# Patient Record
Sex: MALE | Race: WHITE | NOT HISPANIC OR LATINO | Employment: OTHER | URBAN - NONMETROPOLITAN AREA
[De-identification: names, ages, dates, MRNs, and addresses within clinical notes are randomized per-mention and may not be internally consistent; named-entity substitution may affect disease eponyms.]

---

## 2019-12-09 ENCOUNTER — OFFICE VISIT (OUTPATIENT)
Dept: FAMILY MEDICINE CLINIC | Facility: CLINIC | Age: 69
End: 2019-12-09

## 2019-12-09 VITALS
WEIGHT: 181.8 LBS | RESPIRATION RATE: 18 BRPM | TEMPERATURE: 98 F | BODY MASS INDEX: 26.93 KG/M2 | HEART RATE: 56 BPM | HEIGHT: 69 IN | DIASTOLIC BLOOD PRESSURE: 80 MMHG | SYSTOLIC BLOOD PRESSURE: 142 MMHG | OXYGEN SATURATION: 98 %

## 2019-12-09 DIAGNOSIS — E66.3 OVERWEIGHT (BMI 25.0-29.9): ICD-10-CM

## 2019-12-09 DIAGNOSIS — E78.1 HYPERTRIGLYCERIDEMIA: ICD-10-CM

## 2019-12-09 DIAGNOSIS — I10 ESSENTIAL HYPERTENSION: ICD-10-CM

## 2019-12-09 DIAGNOSIS — Z13.0 SCREENING FOR DEFICIENCY ANEMIA: ICD-10-CM

## 2019-12-09 DIAGNOSIS — Z76.89 ESTABLISHING CARE WITH NEW DOCTOR, ENCOUNTER FOR: Primary | ICD-10-CM

## 2019-12-09 DIAGNOSIS — Z13.220 LIPID SCREENING: ICD-10-CM

## 2019-12-09 DIAGNOSIS — E29.1 HYPOGONADISM IN MALE: ICD-10-CM

## 2019-12-09 DIAGNOSIS — F32.A ANXIETY AND DEPRESSION: ICD-10-CM

## 2019-12-09 DIAGNOSIS — Z12.5 SCREENING FOR MALIGNANT NEOPLASM OF PROSTATE: ICD-10-CM

## 2019-12-09 DIAGNOSIS — F41.9 ANXIETY AND DEPRESSION: ICD-10-CM

## 2019-12-09 DIAGNOSIS — Z13.1 DIABETES MELLITUS SCREENING: ICD-10-CM

## 2019-12-09 PROCEDURE — 99204 OFFICE O/P NEW MOD 45 MIN: CPT | Performed by: FAMILY MEDICINE

## 2019-12-09 RX ORDER — SIMVASTATIN 40 MG
40 TABLET ORAL DAILY
COMMUNITY
Start: 2019-11-07 | End: 2019-12-09 | Stop reason: SDUPTHER

## 2019-12-09 RX ORDER — VENLAFAXINE HYDROCHLORIDE 75 MG/1
75 CAPSULE, EXTENDED RELEASE ORAL DAILY
Qty: 90 CAPSULE | Refills: 3 | Status: SHIPPED | OUTPATIENT
Start: 2019-12-09 | End: 2020-12-14 | Stop reason: SDUPTHER

## 2019-12-09 RX ORDER — ATENOLOL 50 MG/1
50 TABLET ORAL DAILY
Qty: 90 TABLET | Refills: 3 | Status: SHIPPED | OUTPATIENT
Start: 2019-12-09 | End: 2020-12-14 | Stop reason: SDUPTHER

## 2019-12-09 RX ORDER — LOSARTAN POTASSIUM 50 MG/1
50 TABLET ORAL
COMMUNITY
Start: 2019-09-26 | End: 2019-12-09 | Stop reason: SDUPTHER

## 2019-12-09 RX ORDER — LOSARTAN POTASSIUM 50 MG/1
50 TABLET ORAL
Qty: 90 TABLET | Refills: 3 | Status: SHIPPED | OUTPATIENT
Start: 2019-12-09 | End: 2020-06-09 | Stop reason: ALTCHOICE

## 2019-12-09 RX ORDER — LOSARTAN POTASSIUM AND HYDROCHLOROTHIAZIDE 12.5; 5 MG/1; MG/1
TABLET ORAL DAILY
COMMUNITY
Start: 2019-11-30 | End: 2019-12-09 | Stop reason: SDUPTHER

## 2019-12-09 RX ORDER — ASPIRIN 81 MG/1
81 TABLET, CHEWABLE ORAL DAILY
COMMUNITY

## 2019-12-09 RX ORDER — CHLORAL HYDRATE 500 MG
2000 CAPSULE ORAL 2 TIMES DAILY WITH MEALS
COMMUNITY

## 2019-12-09 RX ORDER — FENOFIBRATE 160 MG/1
160 TABLET ORAL DAILY
COMMUNITY
Start: 2019-11-21 | End: 2019-12-09 | Stop reason: SDUPTHER

## 2019-12-09 RX ORDER — VENLAFAXINE HYDROCHLORIDE 75 MG/1
75 CAPSULE, EXTENDED RELEASE ORAL DAILY
COMMUNITY
Start: 2019-12-02 | End: 2019-12-09 | Stop reason: SDUPTHER

## 2019-12-09 RX ORDER — TESTOSTERONE 10 MG/.5G
GEL, METERED TOPICAL DAILY
Refills: 1 | COMMUNITY
Start: 2019-10-19 | End: 2020-01-02 | Stop reason: SDUPTHER

## 2019-12-09 RX ORDER — SIMVASTATIN 40 MG
20 TABLET ORAL NIGHTLY
Qty: 90 TABLET | Refills: 1 | Status: SHIPPED | OUTPATIENT
Start: 2019-12-09 | End: 2020-06-09 | Stop reason: SDUPTHER

## 2019-12-09 RX ORDER — FENOFIBRATE 160 MG/1
160 TABLET ORAL DAILY
Qty: 90 TABLET | Refills: 0 | Status: SHIPPED | OUTPATIENT
Start: 2019-12-09 | End: 2020-05-18

## 2019-12-09 RX ORDER — ATENOLOL 50 MG/1
50 TABLET ORAL DAILY
COMMUNITY
Start: 2019-11-07 | End: 2019-12-09 | Stop reason: SDUPTHER

## 2019-12-09 RX ORDER — LOSARTAN POTASSIUM AND HYDROCHLOROTHIAZIDE 12.5; 5 MG/1; MG/1
1 TABLET ORAL DAILY
Qty: 90 TABLET | Refills: 3 | Status: SHIPPED | OUTPATIENT
Start: 2019-12-09 | End: 2020-02-26

## 2019-12-09 RX ORDER — ASCORBIC ACID 500 MG
500 TABLET ORAL DAILY
COMMUNITY

## 2019-12-09 NOTE — PATIENT INSTRUCTIONS
"Hypertension  Hypertension, commonly called high blood pressure, is when the force of blood pumping through the arteries is too strong. The arteries are the blood vessels that carry blood from the heart throughout the body. Hypertension forces the heart to work harder to pump blood and may cause arteries to become narrow or stiff. Having untreated or uncontrolled hypertension can cause heart attacks, strokes, kidney disease, and other problems.  A blood pressure reading consists of a higher number over a lower number. Ideally, your blood pressure should be below 120/80. The first (\"top\") number is called the systolic pressure. It is a measure of the pressure in your arteries as your heart beats. The second (\"bottom\") number is called the diastolic pressure. It is a measure of the pressure in your arteries as the heart relaxes.  What are the causes?  The cause of this condition is not known.  What increases the risk?  Some risk factors for high blood pressure are under your control. Others are not.  Factors you can change  · Smoking.  · Having type 2 diabetes mellitus, high cholesterol, or both.  · Not getting enough exercise or physical activity.  · Being overweight.  · Having too much fat, sugar, calories, or salt (sodium) in your diet.  · Drinking too much alcohol.  Factors that are difficult or impossible to change  · Having chronic kidney disease.  · Having a family history of high blood pressure.  · Age. Risk increases with age.  · Race. You may be at higher risk if you are -American.  · Gender. Men are at higher risk than women before age 45. After age 65, women are at higher risk than men.  · Having obstructive sleep apnea.  · Stress.  What are the signs or symptoms?  Extremely high blood pressure (hypertensive crisis) may cause:  · Headache.  · Anxiety.  · Shortness of breath.  · Nosebleed.  · Nausea and vomiting.  · Severe chest pain.  · Jerky movements you cannot control (seizures).  How is this " diagnosed?  This condition is diagnosed by measuring your blood pressure while you are seated, with your arm resting on a surface. The cuff of the blood pressure monitor will be placed directly against the skin of your upper arm at the level of your heart. It should be measured at least twice using the same arm. Certain conditions can cause a difference in blood pressure between your right and left arms.  Certain factors can cause blood pressure readings to be lower or higher than normal (elevated) for a short period of time:  · When your blood pressure is higher when you are in a health care provider's office than when you are at home, this is called white coat hypertension. Most people with this condition do not need medicines.  · When your blood pressure is higher at home than when you are in a health care provider's office, this is called masked hypertension. Most people with this condition may need medicines to control blood pressure.  If you have a high blood pressure reading during one visit or you have normal blood pressure with other risk factors:  · You may be asked to return on a different day to have your blood pressure checked again.  · You may be asked to monitor your blood pressure at home for 1 week or longer.  If you are diagnosed with hypertension, you may have other blood or imaging tests to help your health care provider understand your overall risk for other conditions.  How is this treated?  This condition is treated by making healthy lifestyle changes, such as eating healthy foods, exercising more, and reducing your alcohol intake. Your health care provider may prescribe medicine if lifestyle changes are not enough to get your blood pressure under control, and if:  · Your systolic blood pressure is above 130.  · Your diastolic blood pressure is above 80.  Your personal target blood pressure may vary depending on your medical conditions, your age, and other factors.  Follow these instructions  at home:  Eating and drinking    · Eat a diet that is high in fiber and potassium, and low in sodium, added sugar, and fat. An example eating plan is called the DASH (Dietary Approaches to Stop Hypertension) diet. To eat this way:  ? Eat plenty of fresh fruits and vegetables. Try to fill half of your plate at each meal with fruits and vegetables.  ? Eat whole grains, such as whole wheat pasta, brown rice, or whole grain bread. Fill about one quarter of your plate with whole grains.  ? Eat or drink low-fat dairy products, such as skim milk or low-fat yogurt.  ? Avoid fatty cuts of meat, processed or cured meats, and poultry with skin. Fill about one quarter of your plate with lean proteins, such as fish, chicken without skin, beans, eggs, and tofu.  ? Avoid premade and processed foods. These tend to be higher in sodium, added sugar, and fat.  · Reduce your daily sodium intake. Most people with hypertension should eat less than 1,500 mg of sodium a day.  · Limit alcohol intake to no more than 1 drink a day for nonpregnant women and 2 drinks a day for men. One drink equals 12 oz of beer, 5 oz of wine, or 1½ oz of hard liquor.  Lifestyle    · Work with your health care provider to maintain a healthy body weight or to lose weight. Ask what an ideal weight is for you.  · Get at least 30 minutes of exercise that causes your heart to beat faster (aerobic exercise) most days of the week. Activities may include walking, swimming, or biking.  · Include exercise to strengthen your muscles (resistance exercise), such as pilates or lifting weights, as part of your weekly exercise routine. Try to do these types of exercises for 30 minutes at least 3 days a week.  · Do not use any products that contain nicotine or tobacco, such as cigarettes and e-cigarettes. If you need help quitting, ask your health care provider.  · Monitor your blood pressure at home as told by your health care provider.  · Keep all follow-up visits as told by  "your health care provider. This is important.  Medicines  · Take over-the-counter and prescription medicines only as told by your health care provider. Follow directions carefully. Blood pressure medicines must be taken as prescribed.  · Do not skip doses of blood pressure medicine. Doing this puts you at risk for problems and can make the medicine less effective.  · Ask your health care provider about side effects or reactions to medicines that you should watch for.  Contact a health care provider if:  · You think you are having a reaction to a medicine you are taking.  · You have headaches that keep coming back (recurring).  · You feel dizzy.  · You have swelling in your ankles.  · You have trouble with your vision.  Get help right away if:  · You develop a severe headache or confusion.  · You have unusual weakness or numbness.  · You feel faint.  · You have severe pain in your chest or abdomen.  · You vomit repeatedly.  · You have trouble breathing.  Summary  · Hypertension is when the force of blood pumping through your arteries is too strong. If this condition is not controlled, it may put you at risk for serious complications.  · Your personal target blood pressure may vary depending on your medical conditions, your age, and other factors. For most people, a normal blood pressure is less than 120/80.  · Hypertension is treated with lifestyle changes, medicines, or a combination of both. Lifestyle changes include weight loss, eating a healthy, low-sodium diet, exercising more, and limiting alcohol.  This information is not intended to replace advice given to you by your health care provider. Make sure you discuss any questions you have with your health care provider.  Document Released: 12/18/2006 Document Revised: 11/15/2017 Document Reviewed: 11/15/2017  [employer] Interactive Patient Education © 2019 [employer]      DASH Eating Plan  DASH stands for \"Dietary Approaches to Stop Hypertension.\" The DASH eating " "plan is a healthy eating plan that has been shown to reduce high blood pressure (hypertension). It may also reduce your risk for type 2 diabetes, heart disease, and stroke. The DASH eating plan may also help with weight loss.  What are tips for following this plan?    General guidelines  · Avoid eating more than 2,300 mg (milligrams) of salt (sodium) a day. If you have hypertension, you may need to reduce your sodium intake to 1,500 mg a day.  · Limit alcohol intake to no more than 1 drink a day for nonpregnant women and 2 drinks a day for men. One drink equals 12 oz of beer, 5 oz of wine, or 1½ oz of hard liquor.  · Work with your health care provider to maintain a healthy body weight or to lose weight. Ask what an ideal weight is for you.  · Get at least 30 minutes of exercise that causes your heart to beat faster (aerobic exercise) most days of the week. Activities may include walking, swimming, or biking.  · Work with your health care provider or diet and nutrition specialist (dietitian) to adjust your eating plan to your individual calorie needs.  Reading food labels    · Check food labels for the amount of sodium per serving. Choose foods with less than 5 percent of the Daily Value of sodium. Generally, foods with less than 300 mg of sodium per serving fit into this eating plan.  · To find whole grains, look for the word \"whole\" as the first word in the ingredient list.  Shopping  · Buy products labeled as \"low-sodium\" or \"no salt added.\"  · Buy fresh foods. Avoid canned foods and premade or frozen meals.  Cooking  · Avoid adding salt when cooking. Use salt-free seasonings or herbs instead of table salt or sea salt. Check with your health care provider or pharmacist before using salt substitutes.  · Do not henao foods. Cook foods using healthy methods such as baking, boiling, grilling, and broiling instead.  · Cook with heart-healthy oils, such as olive, canola, soybean, or sunflower oil.  Meal planning  · Eat a " balanced diet that includes:  ? 5 or more servings of fruits and vegetables each day. At each meal, try to fill half of your plate with fruits and vegetables.  ? Up to 6-8 servings of whole grains each day.  ? Less than 6 oz of lean meat, poultry, or fish each day. A 3-oz serving of meat is about the same size as a deck of cards. One egg equals 1 oz.  ? 2 servings of low-fat dairy each day.  ? A serving of nuts, seeds, or beans 5 times each week.  ? Heart-healthy fats. Healthy fats called Omega-3 fatty acids are found in foods such as flaxseeds and coldwater fish, like sardines, salmon, and mackerel.  · Limit how much you eat of the following:  ? Canned or prepackaged foods.  ? Food that is high in trans fat, such as fried foods.  ? Food that is high in saturated fat, such as fatty meat.  ? Sweets, desserts, sugary drinks, and other foods with added sugar.  ? Full-fat dairy products.  · Do not salt foods before eating.  · Try to eat at least 2 vegetarian meals each week.  · Eat more home-cooked food and less restaurant, buffet, and fast food.  · When eating at a restaurant, ask that your food be prepared with less salt or no salt, if possible.  What foods are recommended?  The items listed may not be a complete list. Talk with your dietitian about what dietary choices are best for you.  Grains  Whole-grain or whole-wheat bread. Whole-grain or whole-wheat pasta. Brown rice. Oatmeal. Quinoa. Bulgur. Whole-grain and low-sodium cereals. Olga Lidia bread. Low-fat, low-sodium crackers. Whole-wheat flour tortillas.  Vegetables  Fresh or frozen vegetables (raw, steamed, roasted, or grilled). Low-sodium or reduced-sodium tomato and vegetable juice. Low-sodium or reduced-sodium tomato sauce and tomato paste. Low-sodium or reduced-sodium canned vegetables.  Fruits  All fresh, dried, or frozen fruit. Canned fruit in natural juice (without added sugar).  Meat and other protein foods  Skinless chicken or turkey. Ground chicken or  turkey. Pork with fat trimmed off. Fish and seafood. Egg whites. Dried beans, peas, or lentils. Unsalted nuts, nut butters, and seeds. Unsalted canned beans. Lean cuts of beef with fat trimmed off. Low-sodium, lean deli meat.  Dairy  Low-fat (1%) or fat-free (skim) milk. Fat-free, low-fat, or reduced-fat cheeses. Nonfat, low-sodium ricotta or cottage cheese. Low-fat or nonfat yogurt. Low-fat, low-sodium cheese.  Fats and oils  Soft margarine without trans fats. Vegetable oil. Low-fat, reduced-fat, or light mayonnaise and salad dressings (reduced-sodium). Canola, safflower, olive, soybean, and sunflower oils. Avocado.  Seasoning and other foods  Herbs. Spices. Seasoning mixes without salt. Unsalted popcorn and pretzels. Fat-free sweets.  What foods are not recommended?  The items listed may not be a complete list. Talk with your dietitian about what dietary choices are best for you.  Grains  Baked goods made with fat, such as croissants, muffins, or some breads. Dry pasta or rice meal packs.  Vegetables  Creamed or fried vegetables. Vegetables in a cheese sauce. Regular canned vegetables (not low-sodium or reduced-sodium). Regular canned tomato sauce and paste (not low-sodium or reduced-sodium). Regular tomato and vegetable juice (not low-sodium or reduced-sodium). Pickles. Olives.  Fruits  Canned fruit in a light or heavy syrup. Fried fruit. Fruit in cream or butter sauce.  Meat and other protein foods  Fatty cuts of meat. Ribs. Fried meat. Marie. Sausage. Bologna and other processed lunch meats. Salami. Fatback. Hotdogs. Bratwurst. Salted nuts and seeds. Canned beans with added salt. Canned or smoked fish. Whole eggs or egg yolks. Chicken or turkey with skin.  Dairy  Whole or 2% milk, cream, and half-and-half. Whole or full-fat cream cheese. Whole-fat or sweetened yogurt. Full-fat cheese. Nondairy creamers. Whipped toppings. Processed cheese and cheese spreads.  Fats and oils  Butter. Stick margarine. Lard.  Shortening. Ghee. Marie fat. Tropical oils, such as coconut, palm kernel, or palm oil.  Seasoning and other foods  Salted popcorn and pretzels. Onion salt, garlic salt, seasoned salt, table salt, and sea salt. Worcestershire sauce. Tartar sauce. Barbecue sauce. Teriyaki sauce. Soy sauce, including reduced-sodium. Steak sauce. Canned and packaged gravies. Fish sauce. Oyster sauce. Cocktail sauce. Horseradish that you find on the shelf. Ketchup. Mustard. Meat flavorings and tenderizers. Bouillon cubes. Hot sauce and Tabasco sauce. Premade or packaged marinades. Premade or packaged taco seasonings. Relishes. Regular salad dressings.  Where to find more information:  · National Heart, Lung, and Blood [place]: www.nhlbi.nih.gov  · American Heart Association: www.heart.org  Summary  · The DASH eating plan is a healthy eating plan that has been shown to reduce high blood pressure (hypertension). It may also reduce your risk for type 2 diabetes, heart disease, and stroke.  · With the DASH eating plan, you should limit salt (sodium) intake to 2,300 mg a day. If you have hypertension, you may need to reduce your sodium intake to 1,500 mg a day.  · When on the DASH eating plan, aim to eat more fresh fruits and vegetables, whole grains, lean proteins, low-fat dairy, and heart-healthy fats.  · Work with your health care provider or diet and nutrition specialist (dietitian) to adjust your eating plan to your individual calorie needs.  This information is not intended to replace advice given to you by your health care provider. Make sure you discuss any questions you have with your health care provider.  Document Released: 12/06/2012 Document Revised: 12/11/2017 Document Reviewed: 12/11/2017  [employer] Interactive Patient Education © 2019 [employer]

## 2019-12-09 NOTE — PROGRESS NOTES
"Subjective cc: est care for preventative health/HTN  Saman Ovalles is a 69 y.o. male who presents to est care for HTN and HLD.     HLD: on statin, fibrate and fish oil, several years, better controlled per pt   No MI, no CVA   HTN: 1 year ago, losartan/HCTZ in AM, losartan QHS, atenolol - for irregular HR  Vit d def: on supplement  Low testosterone - on gel daily for about 15 years, no known prostate CA, no FH of prostate CA, denies urinary symptoms.   Depression: working well on Effexor, 25 years     No other known medical issues     No prior surgeries   Non smoker, former smoker when teenager - quit at age 20, maybe smoked 3-5 years   Alcohol use every other day, drinks beer, 6 every other day   No illicit drug use   Retired - worked for government     FH: father with CAD -  from bypass - age 62 when he passed away.   Sister with breast cancer   No known colon cancer     Pt denies any symptoms     Colonoscopy: 4 years ago - done at [facility], normal per pt     History of Present Illness     The following portions of the patient's history were reviewed and updated as appropriate: allergies, current medications, past family history, past medical history, past social history, past surgical history and problem list.        Review of Systems   Constitutional: Negative for activity change, appetite change, fatigue, fever and unexpected weight change.   Respiratory: Negative for cough and shortness of breath.    Cardiovascular: Negative for chest pain, palpitations and leg swelling.   Gastrointestinal: Negative for abdominal distention, abdominal pain, blood in stool, constipation, diarrhea, nausea and vomiting.   Genitourinary: Negative for difficulty urinating, dysuria, flank pain, frequency and hematuria.   All other systems reviewed and are negative.      Objective   Blood pressure 142/80, pulse 56, temperature 98 °F (36.7 °C), temperature source Oral, resp. rate 18, height 175.3 cm (69\"), weight 82.5 kg (181 " lb 12.8 oz), SpO2 98 %.  Physical Exam   Constitutional: He is oriented to person, place, and time. He appears well-developed and well-nourished. No distress.   HENT:   Head: Normocephalic and atraumatic.   Right Ear: External ear normal.   Left Ear: External ear normal.   Nose: Nose normal.   Mouth/Throat: Oropharynx is clear and moist.   Eyes: Conjunctivae and EOM are normal. Right eye exhibits no discharge. Left eye exhibits no discharge.   Neck: Normal range of motion. No tracheal deviation present. No thyromegaly present.   Cardiovascular: Normal rate, regular rhythm, normal heart sounds and intact distal pulses.   Pulmonary/Chest: Effort normal and breath sounds normal. No stridor. No respiratory distress. He has no wheezes. He exhibits no tenderness.   Abdominal: Soft. Bowel sounds are normal. He exhibits no distension. There is no tenderness.   Musculoskeletal: Normal range of motion. He exhibits no edema.   Lymphadenopathy:     He has no cervical adenopathy.   Neurological: He is alert and oriented to person, place, and time. He exhibits normal muscle tone. Coordination normal.   Skin: Skin is warm and dry. He is not diaphoretic.   Psychiatric: He has a normal mood and affect. His behavior is normal. Judgment and thought content normal.   Nursing note and vitals reviewed.      Assessment/Plan   Problems Addressed this Visit        Cardiovascular and Mediastinum    Hypertriglyceridemia    Relevant Medications    simvastatin (ZOCOR) 40 MG tablet    fenofibrate 160 MG tablet    Essential hypertension    Relevant Medications    losartan-hydrochlorothiazide (HYZAAR) 50-12.5 MG per tablet    losartan (COZAAR) 50 MG tablet    atenolol (TENORMIN) 50 MG tablet       Endocrine    Hypogonadism in male    Relevant Orders    Testosterone       Other    Anxiety and depression    Relevant Medications    venlafaxine XR (EFFEXOR-XR) 75 MG 24 hr capsule    Overweight (BMI 25.0-29.9)      Other Visit Diagnoses      Establishing care with new doctor, encounter for    -  Primary    Lipid screening        Relevant Orders    Lipid Panel    Screening for malignant neoplasm of prostate        Relevant Orders    PSA Screen    Diabetes mellitus screening        Relevant Orders    Comprehensive Metabolic Panel    Screening for deficiency anemia        Relevant Orders    CBC & Differential          PLAN:     #1 HLD: new to me, chronic for pt, continue on current medicaiton, check labs     #2 HTN: new to me, chronic for pt, continue on current medciation, check labs     #3 hypogonadism: new to me, chronic for pt, check labs, conitnue on testosterone     #4 anxiety and depression: new to me, chronic for pt, conitue on effexor     #5 overweight: Patient's Body mass index is 26.85 kg/m². BMI is above normal parameters. Recommendations include: educational material, exercise counseling and nutrition counseling.          This document has been electronically signed by Dawna Daniels MD on December 26, 2019 4:18 PM

## 2019-12-26 PROBLEM — E78.1 HYPERTRIGLYCERIDEMIA: Status: ACTIVE | Noted: 2019-12-26

## 2019-12-26 PROBLEM — E29.1 HYPOGONADISM IN MALE: Status: ACTIVE | Noted: 2019-12-26

## 2019-12-26 PROBLEM — F32.A ANXIETY AND DEPRESSION: Status: ACTIVE | Noted: 2019-12-26

## 2019-12-26 PROBLEM — F41.9 ANXIETY AND DEPRESSION: Status: ACTIVE | Noted: 2019-12-26

## 2019-12-26 PROBLEM — E66.3 OVERWEIGHT (BMI 25.0-29.9): Status: ACTIVE | Noted: 2019-12-26

## 2019-12-26 PROBLEM — I10 ESSENTIAL HYPERTENSION: Status: ACTIVE | Noted: 2019-12-26

## 2019-12-31 ENCOUNTER — TELEPHONE (OUTPATIENT)
Dept: FAMILY MEDICINE CLINIC | Facility: CLINIC | Age: 69
End: 2019-12-31

## 2019-12-31 NOTE — TELEPHONE ENCOUNTER
----- Message from Dawna Daniels MD sent at 12/9/2019  2:23 PM CST -----  Please contact [facility] and determine if they will do testosterone or no? If not - will need to send it to local [facility]

## 2019-12-31 NOTE — TELEPHONE ENCOUNTER
Spoke with [facility] pharmacy who reports that they will fill testosterone. Spoke with Lars to verify.

## 2019-12-31 NOTE — PROGRESS NOTES
Pt was unclear on when he was supposed to have lab testing completed- pt states that he prefers to have labs drawn prior to apt so he can discuss in office with provider. Explained to pt that we can do this going forward, but since he was a new pt that's why labs were done this way this time. Pt also states that his ins wants him to have completed at a [facility], pt states that he will notify us of where to fax lab orders to or will come by to .

## 2020-01-02 DIAGNOSIS — E29.1 HYPOGONADISM IN MALE: Primary | ICD-10-CM

## 2020-01-02 RX ORDER — TESTOSTERONE 10 MG/.5G
1 GEL, METERED TOPICAL DAILY
Qty: 60 G | Refills: 1 | Status: SHIPPED | OUTPATIENT
Start: 2020-01-02 | End: 2020-03-31 | Stop reason: SDUPTHER

## 2020-02-26 RX ORDER — LOSARTAN POTASSIUM 50 MG/1
50 TABLET ORAL DAILY
Qty: 90 TABLET | Refills: 1 | Status: SHIPPED | OUTPATIENT
Start: 2020-02-26 | End: 2020-06-09 | Stop reason: SDUPTHER

## 2020-02-26 RX ORDER — HYDROCHLOROTHIAZIDE 12.5 MG/1
12.5 CAPSULE, GELATIN COATED ORAL DAILY
Qty: 90 CAPSULE | Refills: 1 | Status: SHIPPED | OUTPATIENT
Start: 2020-02-26 | End: 2020-06-09 | Stop reason: ALTCHOICE

## 2020-03-31 DIAGNOSIS — E29.1 HYPOGONADISM IN MALE: ICD-10-CM

## 2020-03-31 RX ORDER — TESTOSTERONE 10 MG/.5G
1 GEL, METERED TOPICAL DAILY
Qty: 180 G | Refills: 0 | Status: SHIPPED | OUTPATIENT
Start: 2020-03-31 | End: 2020-04-03 | Stop reason: SDUPTHER

## 2020-04-03 DIAGNOSIS — E29.1 HYPOGONADISM IN MALE: ICD-10-CM

## 2020-04-03 RX ORDER — TESTOSTERONE 10 MG/.5G
1 GEL, METERED TOPICAL DAILY
Qty: 180 G | Refills: 0 | Status: SHIPPED | OUTPATIENT
Start: 2020-04-03 | End: 2020-06-09 | Stop reason: SDUPTHER

## 2020-05-06 DIAGNOSIS — E29.1 HYPOGONADISM IN MALE: ICD-10-CM

## 2020-05-06 NOTE — TELEPHONE ENCOUNTER
PT CALLED REQUESTING A REFILL ON  - Testosterone 10 MG/ACT (2%) gel    PT ALSO REQUESTED A 90 SUPPLY.     [facility] [place], [place] - [address] AT [address] [facility] - [phone]  - [phone] FX  [phone]    PT'S CALLBACK NUMBER: [phone]

## 2020-05-07 RX ORDER — TESTOSTERONE 10 MG/.5G
1 GEL, METERED TOPICAL DAILY
Qty: 180 G | Refills: 0 | OUTPATIENT
Start: 2020-05-07

## 2020-05-16 DIAGNOSIS — E78.1 HYPERTRIGLYCERIDEMIA: ICD-10-CM

## 2020-05-18 RX ORDER — FENOFIBRATE 160 MG/1
TABLET ORAL
Qty: 90 TABLET | Refills: 0 | Status: SHIPPED | OUTPATIENT
Start: 2020-05-18 | End: 2020-08-17

## 2020-06-09 ENCOUNTER — OFFICE VISIT (OUTPATIENT)
Dept: FAMILY MEDICINE CLINIC | Facility: CLINIC | Age: 70
End: 2020-06-09

## 2020-06-09 VITALS
RESPIRATION RATE: 18 BRPM | WEIGHT: 174.4 LBS | DIASTOLIC BLOOD PRESSURE: 74 MMHG | BODY MASS INDEX: 25.83 KG/M2 | SYSTOLIC BLOOD PRESSURE: 128 MMHG | TEMPERATURE: 97.5 F | OXYGEN SATURATION: 98 % | HEART RATE: 72 BPM | HEIGHT: 69 IN

## 2020-06-09 DIAGNOSIS — I10 ESSENTIAL HYPERTENSION: ICD-10-CM

## 2020-06-09 DIAGNOSIS — E29.1 HYPOGONADISM IN MALE: ICD-10-CM

## 2020-06-09 DIAGNOSIS — E66.3 OVERWEIGHT: ICD-10-CM

## 2020-06-09 DIAGNOSIS — F41.9 ANXIETY AND DEPRESSION: ICD-10-CM

## 2020-06-09 DIAGNOSIS — R73.09 ELEVATED GLUCOSE: ICD-10-CM

## 2020-06-09 DIAGNOSIS — E78.1 HYPERTRIGLYCERIDEMIA: ICD-10-CM

## 2020-06-09 DIAGNOSIS — F32.A ANXIETY AND DEPRESSION: ICD-10-CM

## 2020-06-09 DIAGNOSIS — R94.4 DECREASED GFR: Primary | ICD-10-CM

## 2020-06-09 PROCEDURE — 99214 OFFICE O/P EST MOD 30 MIN: CPT | Performed by: FAMILY MEDICINE

## 2020-06-09 RX ORDER — TESTOSTERONE 10 MG/.5G
6 GEL, METERED TOPICAL DAILY
Qty: 300 G | Refills: 0 | Status: SHIPPED | OUTPATIENT
Start: 2020-06-09 | End: 2020-10-19

## 2020-06-09 RX ORDER — LOSARTAN POTASSIUM 50 MG/1
50 TABLET ORAL DAILY
Qty: 90 TABLET | Refills: 1 | Status: SHIPPED | OUTPATIENT
Start: 2020-06-09 | End: 2020-12-14 | Stop reason: SDUPTHER

## 2020-06-09 RX ORDER — LOSARTAN POTASSIUM AND HYDROCHLOROTHIAZIDE 12.5; 5 MG/1; MG/1
1 TABLET ORAL DAILY
COMMUNITY
End: 2020-06-09

## 2020-06-09 RX ORDER — SIMVASTATIN 40 MG
40 TABLET ORAL NIGHTLY
Qty: 90 TABLET | Refills: 1 | Status: SHIPPED | OUTPATIENT
Start: 2020-06-09 | End: 2020-12-14 | Stop reason: ALTCHOICE

## 2020-06-09 RX ORDER — LOSARTAN POTASSIUM AND HYDROCHLOROTHIAZIDE 12.5; 5 MG/1; MG/1
1 TABLET ORAL DAILY
Qty: 90 TABLET | Refills: 1 | Status: SHIPPED | OUTPATIENT
Start: 2020-06-09 | End: 2020-11-30

## 2020-06-09 NOTE — PROGRESS NOTES
Subjective cc: HTN  Saman Ovalles is a 69 y.o. male who presents for follow up HTN and HLD and to review labs.    No acute complaints today.      HLD: on statin, fibrate and fish oil, several years, lipids could be better, triglycerides are always high per pt, rec increasing dose of simvastatin   No MI, no CVA   HTN: chronic, controlled. losartan/HCTZ in AM, losartan QHS, atenolol - for irregular HR  Vit d def: on supplement  Low testosterone - on gel daily for about 15 years, no known prostate CA, no FH of prostate CA, denies urinary symptoms. Reports he has not been able to get enough of the gel to last him the full 3 months - he is using 6 pumps daily total - the last rx was only written for 1 pump daily.   Depression: working well on Effexor, 25 years   Labs show decreased GFR - pt denies having any problems with this int he past   His glucose was 173 - denies prior dx of diabetes.       No prior surgeries   Non smoker, former smoker when teenager - quit at age 20, maybe smoked 3-5 years   Alcohol use every other day, drinks beer, 6 every other day   No illicit drug use   Retired - worked for [employer]     FH: father with CAD -  from bypass - age 62 when he passed away.   Sister with breast cancer   No known colon cancer     Pt denies any symptoms     Colonoscopy: 4 years ago - done at [facility], normal per pt - reviewed reports - repeat in 10 years    Hypertension   This is a chronic problem. The current episode started more than 1 year ago. The problem is unchanged. The problem is controlled. Pertinent negatives include no anxiety, blurred vision, chest pain, headaches, malaise/fatigue, neck pain, orthopnea, palpitations, peripheral edema, PND, shortness of breath or sweats. Risk factors for coronary artery disease include male gender and dyslipidemia. Current antihypertension treatment includes angiotensin blockers, diuretics and beta blockers. The current treatment provides significant improvement.  Compliance problems include diet, exercise and psychosocial issues.  Hypertensive end-organ damage includes kidney disease. There is no history of angina, CAD/MI, CVA, heart failure or left ventricular hypertrophy. There is no history of sleep apnea or a thyroid problem.   Hyperlipidemia   This is a chronic problem. The current episode started more than 1 year ago. The problem is uncontrolled. Recent lipid tests were reviewed and are high. He has no history of hypothyroidism, liver disease, obesity or nephrotic syndrome. Factors aggravating his hyperlipidemia include fatty foods and beta blockers. Associated symptoms include myalgias. Pertinent negatives include no chest pain, focal sensory loss, focal weakness, leg pain or shortness of breath. Current antihyperlipidemic treatment includes diet change, exercise, fibric acid derivatives and statins. The current treatment provides mild improvement of lipids. Compliance problems include adherence to diet and adherence to exercise.  Risk factors for coronary artery disease include dyslipidemia, male sex and hypertension.        The following portions of the patient's history were reviewed and updated as appropriate: allergies, current medications, past family history, past medical history, past social history, past surgical history and problem list.        Review of Systems   Constitutional: Negative for activity change, appetite change, fatigue, fever, malaise/fatigue and unexpected weight change.   Eyes: Negative for blurred vision.   Respiratory: Negative for cough and shortness of breath.    Cardiovascular: Negative for chest pain, palpitations, orthopnea, leg swelling and PND.   Gastrointestinal: Negative for abdominal distention, abdominal pain, blood in stool, constipation, diarrhea, nausea and vomiting.   Genitourinary: Negative for difficulty urinating, dysuria, flank pain, frequency and hematuria.   Musculoskeletal: Positive for myalgias. Negative for neck  "pain.   Neurological: Negative for focal weakness and headaches.   Psychiatric/Behavioral:        Controlled    All other systems reviewed and are negative.      Objective   Blood pressure 128/74, pulse 72, temperature 97.5 °F (36.4 °C), temperature source Temporal, resp. rate 18, height 175.3 cm (69\"), weight 79.1 kg (174 lb 6.4 oz), SpO2 98 %.  Physical Exam   Constitutional: He is oriented to person, place, and time. He appears well-developed and well-nourished. No distress.   HENT:   Head: Normocephalic and atraumatic.   Right Ear: External ear normal.   Left Ear: External ear normal.   Nose: Nose normal.   Mouth/Throat: Oropharynx is clear and moist.   Eyes: Conjunctivae and EOM are normal. Right eye exhibits no discharge. Left eye exhibits no discharge.   Neck: Normal range of motion. No tracheal deviation present. No thyromegaly present.   Cardiovascular: Normal rate, regular rhythm, normal heart sounds and intact distal pulses.   Pulmonary/Chest: Effort normal and breath sounds normal. No stridor. No respiratory distress. He has no wheezes. He exhibits no tenderness.   Abdominal: Soft. Bowel sounds are normal. He exhibits no distension. There is no tenderness.   Musculoskeletal: Normal range of motion. He exhibits no edema.   Lymphadenopathy:     He has no cervical adenopathy.   Neurological: He is alert and oriented to person, place, and time. He exhibits normal muscle tone. Coordination normal.   Skin: Skin is warm and dry. He is not diaphoretic.   Psychiatric: He has a normal mood and affect. His behavior is normal. Judgment and thought content normal.   Nursing note and vitals reviewed.      Assessment/Plan   Problems Addressed this Visit        Cardiovascular and Mediastinum    Hypertriglyceridemia    Relevant Medications    simvastatin (ZOCOR) 40 MG tablet    Essential hypertension    Relevant Medications    losartan (Cozaar) 50 MG tablet    losartan-hydrochlorothiazide (HYZAAR) 50-12.5 MG per tablet   "       Endocrine    Hypogonadism in male    Relevant Medications    Testosterone 10 MG/ACT (2%) gel       Genitourinary    Decreased GFR - Primary    Relevant Orders    Basic metabolic panel       Other    Anxiety and depression    Overweight      Other Visit Diagnoses     Elevated glucose        Relevant Orders    Basic metabolic panel    Hemoglobin A1c          PLAN:     #1 HLD: chronic for pt, uncontrolled, increase dose of statin, reviewed labs     #2 HTN: controlled, chronic for pt, continue on current medciation    #3 hypogonadism: chronic for pt, reviewed labs, conitnue on testosterone - rx changed to give full 3 month supply     #4 anxiety and depression:  chronic for pt, controlled, conitue on effexor     #5 overweight: Patient's Body mass index is 25.75 kg/m². BMI is above normal parameters. Recommendations include: educational material, exercise counseling and nutrition counseling.    #6 elevated glucose: new, will get ha1c - return to office for further discussion if dx with DM     #7 decreased GFR: new, repeat labs, if still low will rec renal US and adjust medications           This document has been electronically signed by Dawna Daniels MD on June 9, 2020 13:20

## 2020-06-14 ENCOUNTER — RESULTS ENCOUNTER (OUTPATIENT)
Dept: FAMILY MEDICINE CLINIC | Facility: CLINIC | Age: 70
End: 2020-06-14

## 2020-06-14 DIAGNOSIS — R73.09 ELEVATED GLUCOSE: ICD-10-CM

## 2020-06-14 DIAGNOSIS — R94.4 DECREASED GFR: ICD-10-CM

## 2020-06-24 ENCOUNTER — TELEPHONE (OUTPATIENT)
Dept: FAMILY MEDICINE CLINIC | Facility: CLINIC | Age: 70
End: 2020-06-24

## 2020-06-24 ENCOUNTER — HOSPITAL ENCOUNTER (OUTPATIENT)
Dept: GENERAL RADIOLOGY | Facility: HOSPITAL | Age: 70
Discharge: HOME | End: 2020-06-24
Admitting: NURSE PRACTITIONER

## 2020-06-24 ENCOUNTER — OFFICE VISIT (OUTPATIENT)
Dept: FAMILY MEDICINE CLINIC | Facility: CLINIC | Age: 70
End: 2020-06-24

## 2020-06-24 VITALS
TEMPERATURE: 98.4 F | HEIGHT: 69 IN | SYSTOLIC BLOOD PRESSURE: 147 MMHG | OXYGEN SATURATION: 100 % | DIASTOLIC BLOOD PRESSURE: 76 MMHG | HEART RATE: 73 BPM | WEIGHT: 172.8 LBS | BODY MASS INDEX: 25.59 KG/M2 | RESPIRATION RATE: 18 BRPM

## 2020-06-24 DIAGNOSIS — S79.912A HIP INJURY, LEFT, INITIAL ENCOUNTER: Primary | ICD-10-CM

## 2020-06-24 DIAGNOSIS — S76.012A HIP STRAIN, LEFT, INITIAL ENCOUNTER: ICD-10-CM

## 2020-06-24 PROCEDURE — 73502 X-RAY EXAM HIP UNI 2-3 VIEWS: CPT

## 2020-06-24 PROCEDURE — 99213 OFFICE O/P EST LOW 20 MIN: CPT | Performed by: NURSE PRACTITIONER

## 2020-06-24 RX ORDER — CYCLOBENZAPRINE HCL 5 MG
5 TABLET ORAL 2 TIMES DAILY PRN
Qty: 40 TABLET | Refills: 0 | Status: SHIPPED | OUTPATIENT
Start: 2020-06-24 | End: 2020-12-14

## 2020-06-24 RX ORDER — METHYLPREDNISOLONE 4 MG/1
TABLET ORAL
Qty: 21 TABLET | Refills: 0 | Status: SHIPPED | OUTPATIENT
Start: 2020-06-24 | End: 2020-12-14

## 2020-06-24 NOTE — TELEPHONE ENCOUNTER
Caller: Saman Ovalles    Relationship to patient: Self    Best call back number: [phone]    Patient is needing: to know what to do in regards to a pulled ligament in his left hip. He states this happened about 2 weeks ago (not work or accident related) and had believed it would heal on it's own. Patient states he has been in pain and has barely been able to sleep. He states he has taking ibuprofen to try to relieve the pain, but it has not really helped. Please advise and call patient back to discuss a plan of care.

## 2020-06-24 NOTE — PROGRESS NOTES
Subjective   Saman Ovalles is a 69 y.o. male presents in office for left hip pain.     History of Present Illness   Left hip pain  New. Injury occurred about 2 weeks ago and still causing pain. Pain is in anterior area of left hip radiating into groin and down left leg. Reports he was stepping up into his boat. Did not hear a pop but felt immediate pain. No numbness. Pain is daily. Worse with changing positions. Able to ambulate but painful.     The following portions of the patient's history were reviewed and updated as appropriate: allergies, current medications, past family history, past medical history, past social history, past surgical history and problem list.    Review of Systems   Constitutional: Positive for activity change. Negative for appetite change, chills, diaphoresis, fatigue and fever.   Musculoskeletal: Positive for arthralgias and gait problem. Negative for back pain, joint swelling, myalgias, neck pain and neck stiffness.   Skin: Negative for color change and wound.       Objective     Vitals:    06/24/20 1534   BP: 147/76   Pulse: 73   Resp: 18   Temp: 98.4 °F (36.9 °C)   SpO2: 100%       Physical Exam   Constitutional: He is oriented to person, place, and time. Vital signs are normal. He appears well-developed and well-nourished.   HENT:   Head: Normocephalic and atraumatic.   Eyes: Conjunctivae are normal.   Neck: Neck supple.   Cardiovascular: Normal rate and regular rhythm.   Pulmonary/Chest: Effort normal.   Musculoskeletal:        Left hip: He exhibits decreased range of motion, decreased strength, tenderness and bony tenderness. He exhibits no swelling, no crepitus and no deformity.        Legs:  Lymphadenopathy:     He has no cervical adenopathy.   Neurological: He is alert and oriented to person, place, and time.   Skin: Skin is warm, dry and intact.   Psychiatric: He has a normal mood and affect.   Nursing note and vitals reviewed.         Patient's Body mass index is 25.51 kg/m². BMI  is above normal parameters. Recommendations include: none (medical contraindication) (acute visit today).       Assessment/Plan   Saman was seen today for hip pain.    Diagnoses and all orders for this visit:    Hip injury, left, initial encounter  -     Cancel: XR hip w or wo pelvis 4 view left  -     XR Hip With or Without Pelvis 2 - 3 View Left    Hip strain, left, initial encounter    Other orders  -     cyclobenzaprine (FLEXERIL) 5 MG tablet; Take 1 tablet by mouth 2 (Two) Times a Day As Needed for Muscle Spasms.  -     methylPREDNISolone (MEDROL, MARKO,) 4 MG tablet; Take as directed on package instructions.      Xr Hip With Or Without Pelvis 2 - 3 View Left    Result Date: 6/24/2020  Narrative: Left hip, 2 views. AP pelvis. 6/24/2020 3:58 PM CDT  History: Anterior left hip pain  Comparison: None  Findings: Frontal and lateral views of the left hip were obtained. Additional AP pelvis.  There is no fracture or joint subluxation. Bilateral moderate hip joint osteoarthritis. The pelvic ring is intact. Pubic symphysis and SI joint alignment is anatomic.  No gross soft tissue abnormality is visualized.      Impression: Impression: 1. No visualized fracture or joint subluxation. Bilateral moderate hip joint osteoarthritis.  This report was finalized on 06/24/2020 16:17 by Dr Aldair Chairez, .    If no improvement we will need to do physical therapy.       Return for Next scheduled follow up.             Electronically signed by JEFFREY Adkins, 06/24/20, 4:48 PM.

## 2020-07-26 DIAGNOSIS — I10 ESSENTIAL HYPERTENSION: ICD-10-CM

## 2020-07-27 RX ORDER — LOSARTAN POTASSIUM 50 MG/1
TABLET ORAL
Qty: 90 TABLET | Refills: 1 | OUTPATIENT
Start: 2020-07-27

## 2020-07-30 ENCOUNTER — TELEPHONE (OUTPATIENT)
Dept: FAMILY MEDICINE CLINIC | Facility: CLINIC | Age: 70
End: 2020-07-30

## 2020-07-30 NOTE — TELEPHONE ENCOUNTER
----- Message from Saman Ovalles sent at 7/30/2020  9:03 AM CDT -----  Regarding: Prescription Question  Why has Losartin been denied?

## 2020-07-30 NOTE — TELEPHONE ENCOUNTER
I have spoken to pt and explained that this was sent in on the 6/9/2020 anette of 90 with 1 refill.

## 2020-08-16 DIAGNOSIS — E78.1 HYPERTRIGLYCERIDEMIA: ICD-10-CM

## 2020-08-17 RX ORDER — FENOFIBRATE 160 MG/1
TABLET ORAL
Qty: 90 TABLET | Refills: 0 | Status: SHIPPED | OUTPATIENT
Start: 2020-08-17 | End: 2020-11-16

## 2020-10-17 DIAGNOSIS — E29.1 HYPOGONADISM IN MALE: ICD-10-CM

## 2020-10-19 RX ORDER — TESTOSTERONE 10 MG/.5G
GEL, METERED TOPICAL
Qty: 300 G | Refills: 0 | Status: SHIPPED | OUTPATIENT
Start: 2020-10-19 | End: 2020-12-14 | Stop reason: SDUPTHER

## 2020-11-16 DIAGNOSIS — E78.1 HYPERTRIGLYCERIDEMIA: ICD-10-CM

## 2020-11-16 RX ORDER — FENOFIBRATE 160 MG/1
TABLET ORAL
Qty: 90 TABLET | Refills: 0 | Status: SHIPPED | OUTPATIENT
Start: 2020-11-16 | End: 2021-02-15

## 2020-11-30 DIAGNOSIS — I10 ESSENTIAL HYPERTENSION: ICD-10-CM

## 2020-11-30 DIAGNOSIS — E66.3 OVERWEIGHT: ICD-10-CM

## 2020-11-30 DIAGNOSIS — E78.1 HYPERTRIGLYCERIDEMIA: Primary | ICD-10-CM

## 2020-11-30 DIAGNOSIS — R94.4 DECREASED GFR: ICD-10-CM

## 2020-11-30 RX ORDER — LOSARTAN POTASSIUM AND HYDROCHLOROTHIAZIDE 12.5; 5 MG/1; MG/1
TABLET ORAL
Qty: 90 TABLET | Refills: 1 | Status: SHIPPED | OUTPATIENT
Start: 2020-11-30 | End: 2021-03-15 | Stop reason: SDUPTHER

## 2020-12-05 ENCOUNTER — RESULTS ENCOUNTER (OUTPATIENT)
Dept: FAMILY MEDICINE CLINIC | Facility: CLINIC | Age: 70
End: 2020-12-05

## 2020-12-05 DIAGNOSIS — I10 ESSENTIAL HYPERTENSION: ICD-10-CM

## 2020-12-05 DIAGNOSIS — R94.4 DECREASED GFR: ICD-10-CM

## 2020-12-05 DIAGNOSIS — E66.3 OVERWEIGHT: ICD-10-CM

## 2020-12-05 DIAGNOSIS — E78.1 HYPERTRIGLYCERIDEMIA: ICD-10-CM

## 2020-12-14 ENCOUNTER — OFFICE VISIT (OUTPATIENT)
Dept: FAMILY MEDICINE CLINIC | Facility: CLINIC | Age: 70
End: 2020-12-14

## 2020-12-14 VITALS
WEIGHT: 181.8 LBS | BODY MASS INDEX: 26.93 KG/M2 | OXYGEN SATURATION: 98 % | DIASTOLIC BLOOD PRESSURE: 80 MMHG | HEIGHT: 69 IN | SYSTOLIC BLOOD PRESSURE: 142 MMHG | HEART RATE: 72 BPM | TEMPERATURE: 97.1 F | RESPIRATION RATE: 16 BRPM

## 2020-12-14 DIAGNOSIS — E29.1 HYPOGONADISM IN MALE: ICD-10-CM

## 2020-12-14 DIAGNOSIS — I10 ESSENTIAL HYPERTENSION: ICD-10-CM

## 2020-12-14 DIAGNOSIS — R94.4 DECREASED GFR: ICD-10-CM

## 2020-12-14 DIAGNOSIS — E66.3 OVERWEIGHT: ICD-10-CM

## 2020-12-14 DIAGNOSIS — D58.2 ELEVATED HEMOGLOBIN (HCC): ICD-10-CM

## 2020-12-14 DIAGNOSIS — R73.09 ELEVATED GLUCOSE: ICD-10-CM

## 2020-12-14 DIAGNOSIS — F32.A ANXIETY AND DEPRESSION: ICD-10-CM

## 2020-12-14 DIAGNOSIS — R73.03 PRE-DIABETES: ICD-10-CM

## 2020-12-14 DIAGNOSIS — E78.1 HYPERTRIGLYCERIDEMIA: Primary | ICD-10-CM

## 2020-12-14 DIAGNOSIS — F41.9 ANXIETY AND DEPRESSION: ICD-10-CM

## 2020-12-14 LAB — HBA1C MFR BLD: 6.4 %

## 2020-12-14 PROCEDURE — 83036 HEMOGLOBIN GLYCOSYLATED A1C: CPT | Performed by: FAMILY MEDICINE

## 2020-12-14 PROCEDURE — 99214 OFFICE O/P EST MOD 30 MIN: CPT | Performed by: FAMILY MEDICINE

## 2020-12-14 RX ORDER — VENLAFAXINE HYDROCHLORIDE 75 MG/1
75 CAPSULE, EXTENDED RELEASE ORAL DAILY
Qty: 90 CAPSULE | Refills: 3 | Status: SHIPPED | OUTPATIENT
Start: 2020-12-14 | End: 2021-09-28 | Stop reason: SDUPTHER

## 2020-12-14 RX ORDER — ATENOLOL 50 MG/1
50 TABLET ORAL DAILY
Qty: 90 TABLET | Refills: 3 | Status: SHIPPED | OUTPATIENT
Start: 2020-12-14 | End: 2021-09-28 | Stop reason: SDUPTHER

## 2020-12-14 RX ORDER — TESTOSTERONE 10 MG/.5G
6 GEL, METERED TOPICAL DAILY
Qty: 300 G | Refills: 0 | Status: SHIPPED | OUTPATIENT
Start: 2020-12-14 | End: 2021-01-15

## 2020-12-14 RX ORDER — ATORVASTATIN CALCIUM 40 MG/1
40 TABLET, FILM COATED ORAL NIGHTLY
Qty: 90 TABLET | Refills: 3 | Status: SHIPPED | OUTPATIENT
Start: 2020-12-14 | End: 2021-09-28 | Stop reason: SDUPTHER

## 2020-12-14 RX ORDER — LOSARTAN POTASSIUM 50 MG/1
50 TABLET ORAL DAILY
Qty: 90 TABLET | Refills: 1 | Status: SHIPPED | OUTPATIENT
Start: 2020-12-14 | End: 2021-03-15 | Stop reason: SDUPTHER

## 2020-12-14 NOTE — PATIENT INSTRUCTIONS
"Hypertension, Adult  High blood pressure (hypertension) is when the force of blood pumping through the arteries is too strong. The arteries are the blood vessels that carry blood from the heart throughout the body. Hypertension forces the heart to work harder to pump blood and may cause arteries to become narrow or stiff. Untreated or uncontrolled hypertension can cause a heart attack, heart failure, a stroke, kidney disease, and other problems.  A blood pressure reading consists of a higher number over a lower number. Ideally, your blood pressure should be below 120/80. The first (\"top\") number is called the systolic pressure. It is a measure of the pressure in your arteries as your heart beats. The second (\"bottom\") number is called the diastolic pressure. It is a measure of the pressure in your arteries as the heart relaxes.  What are the causes?  The exact cause of this condition is not known. There are some conditions that result in or are related to high blood pressure.  What increases the risk?  Some risk factors for high blood pressure are under your control. The following factors may make you more likely to develop this condition:  · Smoking.  · Having type 2 diabetes mellitus, high cholesterol, or both.  · Not getting enough exercise or physical activity.  · Being overweight.  · Having too much fat, sugar, calories, or salt (sodium) in your diet.  · Drinking too much alcohol.  Some risk factors for high blood pressure may be difficult or impossible to change. Some of these factors include:  · Having chronic kidney disease.  · Having a family history of high blood pressure.  · Age. Risk increases with age.  · Race. You may be at higher risk if you are .  · Gender. Men are at higher risk than women before age 45. After age 65, women are at higher risk than men.  · Having obstructive sleep apnea.  · Stress.  What are the signs or symptoms?  High blood pressure may not cause symptoms. Very high " blood pressure (hypertensive crisis) may cause:  · Headache.  · Anxiety.  · Shortness of breath.  · Nosebleed.  · Nausea and vomiting.  · Vision changes.  · Severe chest pain.  · Seizures.  How is this diagnosed?  This condition is diagnosed by measuring your blood pressure while you are seated, with your arm resting on a flat surface, your legs uncrossed, and your feet flat on the floor. The cuff of the blood pressure monitor will be placed directly against the skin of your upper arm at the level of your heart. It should be measured at least twice using the same arm. Certain conditions can cause a difference in blood pressure between your right and left arms.  Certain factors can cause blood pressure readings to be lower or higher than normal for a short period of time:  · When your blood pressure is higher when you are in a health care provider's office than when you are at home, this is called white coat hypertension. Most people with this condition do not need medicines.  · When your blood pressure is higher at home than when you are in a health care provider's office, this is called masked hypertension. Most people with this condition may need medicines to control blood pressure.  If you have a high blood pressure reading during one visit or you have normal blood pressure with other risk factors, you may be asked to:  · Return on a different day to have your blood pressure checked again.  · Monitor your blood pressure at home for 1 week or longer.  If you are diagnosed with hypertension, you may have other blood or imaging tests to help your health care provider understand your overall risk for other conditions.  How is this treated?  This condition is treated by making healthy lifestyle changes, such as eating healthy foods, exercising more, and reducing your alcohol intake. Your health care provider may prescribe medicine if lifestyle changes are not enough to get your blood pressure under control, and  if:  · Your systolic blood pressure is above 130.  · Your diastolic blood pressure is above 80.  Your personal target blood pressure may vary depending on your medical conditions, your age, and other factors.  Follow these instructions at home:  Eating and drinking    · Eat a diet that is high in fiber and potassium, and low in sodium, added sugar, and fat. An example eating plan is called the DASH (Dietary Approaches to Stop Hypertension) diet. To eat this way:  ? Eat plenty of fresh fruits and vegetables. Try to fill one half of your plate at each meal with fruits and vegetables.  ? Eat whole grains, such as whole-wheat pasta, brown rice, or whole-grain bread. Fill about one fourth of your plate with whole grains.  ? Eat or drink low-fat dairy products, such as skim milk or low-fat yogurt.  ? Avoid fatty cuts of meat, processed or cured meats, and poultry with skin. Fill about one fourth of your plate with lean proteins, such as fish, chicken without skin, beans, eggs, or tofu.  ? Avoid pre-made and processed foods. These tend to be higher in sodium, added sugar, and fat.  · Reduce your daily sodium intake. Most people with hypertension should eat less than 1,500 mg of sodium a day.  · Do not drink alcohol if:  ? Your health care provider tells you not to drink.  ? You are pregnant, may be pregnant, or are planning to become pregnant.  · If you drink alcohol:  ? Limit how much you use to:  § 0-1 drink a day for women.  § 0-2 drinks a day for men.  ? Be aware of how much alcohol is in your drink. In the [place] one drink equals one 12 oz bottle of beer (355 mL), one 5 oz glass of wine (148 mL), or one 1½ oz glass of hard liquor (44 mL).  Lifestyle    · Work with your health care provider to maintain a healthy body weight or to lose weight. Ask what an ideal weight is for you.  · Get at least 30 minutes of exercise most days of the week. Activities may include walking, swimming, or biking.  · Include exercise to  strengthen your muscles (resistance exercise), such as Pilates or lifting weights, as part of your weekly exercise routine. Try to do these types of exercises for 30 minutes at least 3 days a week.  · Do not use any products that contain nicotine or tobacco, such as cigarettes, e-cigarettes, and chewing tobacco. If you need help quitting, ask your health care provider.  · Monitor your blood pressure at home as told by your health care provider.  · Keep all follow-up visits as told by your health care provider. This is important.  Medicines  · Take over-the-counter and prescription medicines only as told by your health care provider. Follow directions carefully. Blood pressure medicines must be taken as prescribed.  · Do not skip doses of blood pressure medicine. Doing this puts you at risk for problems and can make the medicine less effective.  · Ask your health care provider about side effects or reactions to medicines that you should watch for.  Contact a health care provider if you:  · Think you are having a reaction to a medicine you are taking.  · Have headaches that keep coming back (recurring).  · Feel dizzy.  · Have swelling in your ankles.  · Have trouble with your vision.  Get help right away if you:  · Develop a severe headache or confusion.  · Have unusual weakness or numbness.  · Feel faint.  · Have severe pain in your chest or abdomen.  · Vomit repeatedly.  · Have trouble breathing.  Summary  · Hypertension is when the force of blood pumping through your arteries is too strong. If this condition is not controlled, it may put you at risk for serious complications.  · Your personal target blood pressure may vary depending on your medical conditions, your age, and other factors. For most people, a normal blood pressure is less than 120/80.  · Hypertension is treated with lifestyle changes, medicines, or a combination of both. Lifestyle changes include losing weight, eating a healthy, low-sodium diet,  "exercising more, and limiting alcohol.  This information is not intended to replace advice given to you by your health care provider. Make sure you discuss any questions you have with your health care provider.  Document Revised: 08/28/2019 Document Reviewed: 08/28/2019  [employer] Patient Education © 2020 [employer]      DASH Eating Plan  DASH stands for \"Dietary Approaches to Stop Hypertension.\" The DASH eating plan is a healthy eating plan that has been shown to reduce high blood pressure (hypertension). It may also reduce your risk for type 2 diabetes, heart disease, and stroke. The DASH eating plan may also help with weight loss.  What are tips for following this plan?    General guidelines  · Avoid eating more than 2,300 mg (milligrams) of salt (sodium) a day. If you have hypertension, you may need to reduce your sodium intake to 1,500 mg a day.  · Limit alcohol intake to no more than 1 drink a day for nonpregnant women and 2 drinks a day for men. One drink equals 12 oz of beer, 5 oz of wine, or 1½ oz of hard liquor.  · Work with your health care provider to maintain a healthy body weight or to lose weight. Ask what an ideal weight is for you.  · Get at least 30 minutes of exercise that causes your heart to beat faster (aerobic exercise) most days of the week. Activities may include walking, swimming, or biking.  · Work with your health care provider or diet and nutrition specialist (dietitian) to adjust your eating plan to your individual calorie needs.  Reading food labels    · Check food labels for the amount of sodium per serving. Choose foods with less than 5 percent of the Daily Value of sodium. Generally, foods with less than 300 mg of sodium per serving fit into this eating plan.  · To find whole grains, look for the word \"whole\" as the first word in the ingredient list.  Shopping  · Buy products labeled as \"low-sodium\" or \"no salt added.\"  · Buy fresh foods. Avoid canned foods and premade or frozen " meals.  Cooking  · Avoid adding salt when cooking. Use salt-free seasonings or herbs instead of table salt or sea salt. Check with your health care provider or pharmacist before using salt substitutes.  · Do not henao foods. Cook foods using healthy methods such as baking, boiling, grilling, and broiling instead.  · Cook with heart-healthy oils, such as olive, canola, soybean, or sunflower oil.  Meal planning  · Eat a balanced diet that includes:  ? 5 or more servings of fruits and vegetables each day. At each meal, try to fill half of your plate with fruits and vegetables.  ? Up to 6-8 servings of whole grains each day.  ? Less than 6 oz of lean meat, poultry, or fish each day. A 3-oz serving of meat is about the same size as a deck of cards. One egg equals 1 oz.  ? 2 servings of low-fat dairy each day.  ? A serving of nuts, seeds, or beans 5 times each week.  ? Heart-healthy fats. Healthy fats called Omega-3 fatty acids are found in foods such as flaxseeds and coldwater fish, like sardines, salmon, and mackerel.  · Limit how much you eat of the following:  ? Canned or prepackaged foods.  ? Food that is high in trans fat, such as fried foods.  ? Food that is high in saturated fat, such as fatty meat.  ? Sweets, desserts, sugary drinks, and other foods with added sugar.  ? Full-fat dairy products.  · Do not salt foods before eating.  · Try to eat at least 2 vegetarian meals each week.  · Eat more home-cooked food and less restaurant, buffet, and fast food.  · When eating at a restaurant, ask that your food be prepared with less salt or no salt, if possible.  What foods are recommended?  The items listed may not be a complete list. Talk with your dietitian about what dietary choices are best for you.  Grains  Whole-grain or whole-wheat bread. Whole-grain or whole-wheat pasta. Brown rice. Oatmeal. Quinoa. Bulgur. Whole-grain and low-sodium cereals. Olga Lidia bread. Low-fat, low-sodium crackers. Whole-wheat flour  tortillas.  Vegetables  Fresh or frozen vegetables (raw, steamed, roasted, or grilled). Low-sodium or reduced-sodium tomato and vegetable juice. Low-sodium or reduced-sodium tomato sauce and tomato paste. Low-sodium or reduced-sodium canned vegetables.  Fruits  All fresh, dried, or frozen fruit. Canned fruit in natural juice (without added sugar).  Meat and other protein foods  Skinless chicken or turkey. Ground chicken or turkey. Pork with fat trimmed off. Fish and seafood. Egg whites. Dried beans, peas, or lentils. Unsalted nuts, nut butters, and seeds. Unsalted canned beans. Lean cuts of beef with fat trimmed off. Low-sodium, lean deli meat.  Dairy  Low-fat (1%) or fat-free (skim) milk. Fat-free, low-fat, or reduced-fat cheeses. Nonfat, low-sodium ricotta or cottage cheese. Low-fat or nonfat yogurt. Low-fat, low-sodium cheese.  Fats and oils  Soft margarine without trans fats. Vegetable oil. Low-fat, reduced-fat, or light mayonnaise and salad dressings (reduced-sodium). Canola, safflower, olive, soybean, and sunflower oils. Avocado.  Seasoning and other foods  Herbs. Spices. Seasoning mixes without salt. Unsalted popcorn and pretzels. Fat-free sweets.  What foods are not recommended?  The items listed may not be a complete list. Talk with your dietitian about what dietary choices are best for you.  Grains  Baked goods made with fat, such as croissants, muffins, or some breads. Dry pasta or rice meal packs.  Vegetables  Creamed or fried vegetables. Vegetables in a cheese sauce. Regular canned vegetables (not low-sodium or reduced-sodium). Regular canned tomato sauce and paste (not low-sodium or reduced-sodium). Regular tomato and vegetable juice (not low-sodium or reduced-sodium). Pickles. Olives.  Fruits  Canned fruit in a light or heavy syrup. Fried fruit. Fruit in cream or butter sauce.  Meat and other protein foods  Fatty cuts of meat. Ribs. Fried meat. Marie. Sausage. Bologna and other processed lunch meats.  Salami. Fatback. Hotdogs. Bratwurst. Salted nuts and seeds. Canned beans with added salt. Canned or smoked fish. Whole eggs or egg yolks. Chicken or turkey with skin.  Dairy  Whole or 2% milk, cream, and half-and-half. Whole or full-fat cream cheese. Whole-fat or sweetened yogurt. Full-fat cheese. Nondairy creamers. Whipped toppings. Processed cheese and cheese spreads.  Fats and oils  Butter. Stick margarine. Lard. Shortening. Ghee. Marie fat. Tropical oils, such as coconut, palm kernel, or palm oil.  Seasoning and other foods  Salted popcorn and pretzels. Onion salt, garlic salt, seasoned salt, table salt, and sea salt. Worcestershire sauce. Tartar sauce. Barbecue sauce. Teriyaki sauce. Soy sauce, including reduced-sodium. Steak sauce. Canned and packaged gravies. Fish sauce. Oyster sauce. Cocktail sauce. Horseradish that you find on the shelf. Ketchup. Mustard. Meat flavorings and tenderizers. Bouillon cubes. Hot sauce and Tabasco sauce. Premade or packaged marinades. Premade or packaged taco seasonings. Relishes. Regular salad dressings.  Where to find more information:  · National Heart, [employer], and [employer] [place]: www.nhlbi.nih.gov  · American Heart Association: www.heart.org  Summary  · The DASH eating plan is a healthy eating plan that has been shown to reduce high blood pressure (hypertension). It may also reduce your risk for type 2 diabetes, heart disease, and stroke.  · With the DASH eating plan, you should limit salt (sodium) intake to 2,300 mg a day. If you have hypertension, you may need to reduce your sodium intake to 1,500 mg a day.  · When on the DASH eating plan, aim to eat more fresh fruits and vegetables, whole grains, lean proteins, low-fat dairy, and heart-healthy fats.  · Work with your health care provider or diet and nutrition specialist (dietitian) to adjust your eating plan to your individual calorie needs.  This information is not intended to replace advice given to you by your health  care provider. Make sure you discuss any questions you have with your health care provider.  Document Revised: 11/30/2018 Document Reviewed: 12/11/2017  [employer] Patient Education © 2020 [employer]      Type 2 Diabetes Mellitus, Diagnosis, Adult  Type 2 diabetes (type 2 diabetes mellitus) is a long-term (chronic) disease. In type 2 diabetes, one or both of these problems may be present:  · The pancreas does not make enough of a hormone called insulin.  · Cells in the body do not respond properly to insulin that the body makes (insulin resistance).  Normally, insulin allows blood sugar (glucose) to enter cells in the body. The cells use glucose for energy. Insulin resistance or lack of insulin causes excess glucose to build up in the blood instead of going into cells. As a result, high blood glucose (hyperglycemia) develops.  What increases the risk?  The following factors may make you more likely to develop type 2 diabetes:  · Having a family member with type 2 diabetes.  · Being overweight or obese.  · Having an inactive (sedentary) lifestyle.  · Having been diagnosed with insulin resistance.  · Having a history of prediabetes, gestational diabetes, or polycystic ovary syndrome (PCOS).  · Being of American-, -American, /, or / descent.  What are the signs or symptoms?  In the early stage of this condition, you may not have symptoms. Symptoms develop slowly and may include:  · Increased thirst (polydipsia).  · Increased hunger (polyphagia).  · Increased urination (polyuria).  · Increased urination during the night (nocturia).  · Unexplained weight loss.  · Frequent infections that keep coming back (recurring).  · Fatigue.  · Weakness.  · Vision changes, such as blurry vision.  · Cuts or bruises that are slow to heal.  · Tingling or numbness in the hands or feet.  · Dark patches on the skin (acanthosis nigricans).  How is this diagnosed?  This condition is diagnosed  based on your symptoms, your medical history, a physical exam, and your blood glucose level. Your blood glucose may be checked with one or more of the following blood tests:  · A fasting blood glucose (FBG) test. You will not be allowed to eat (you will fast) for 8 hours or longer before a blood sample is taken.  · A random blood glucose test. This test checks blood glucose at any time of day regardless of when you ate.  · An A1c (hemoglobin A1c) blood test. This test provides information about blood glucose control over the previous 2-3 months.  · An oral glucose tolerance test (OGTT). This test measures your blood glucose at two times:  ? After fasting. This is your baseline blood glucose level.  ? Two hours after drinking a beverage that contains glucose.  You may be diagnosed with type 2 diabetes if:  · Your FBG level is 126 mg/dL (7.0 mmol/L) or higher.  · Your random blood glucose level is 200 mg/dL (11.1 mmol/L) or higher.  · Your A1c level is 6.5% or higher.  · Your OGTT result is higher than 200 mg/dL (11.1 mmol/L).  These blood tests may be repeated to confirm your diagnosis.  How is this treated?  Your treatment may be managed by a specialist called an endocrinologist. Type 2 diabetes may be treated by following instructions from your health care provider about:  · Making diet and lifestyle changes. This may include:  ? Following an individualized nutrition plan that is developed by a diet and nutrition specialist (registered dietitian).  ? Exercising regularly.  ? Finding ways to manage stress.  · Checking your blood glucose level as often as told.  · Taking diabetes medicines or insulin daily. This helps to keep your blood glucose levels in the healthy range.  ? If you use insulin, you may need to adjust the dosage depending on how physically active you are and what foods you eat. Your health care provider will tell you how to adjust your dosage.  · Taking medicines to help prevent complications from  diabetes, such as:  ? Aspirin.  ? Medicine to lower cholesterol.  ? Medicine to control blood pressure.  Your health care provider will set individualized treatment goals for you. Your goals will be based on your age, other medical conditions you have, and how you respond to diabetes treatment. Generally, the goal of treatment is to maintain the following blood glucose levels:  · Before meals (preprandial):  mg/dL (4.4-7.2 mmol/L).  · After meals (postprandial): below 180 mg/dL (10 mmol/L).  · A1c level: less than 7%.  Follow these instructions at home:  Questions to ask your health care provider  · Consider asking the following questions:  ? Do I need to meet with a diabetes educator?  ? Where can I find a support group for people with diabetes?  ? What equipment will I need to manage my diabetes at home?  ? What diabetes medicines do I need, and when should I take them?  ? How often do I need to check my blood glucose?  ? What number can I call if I have questions?  ? When is my next appointment?  General instructions  · Take over-the-counter and prescription medicines only as told by your health care provider.  · Keep all follow-up visits as told by your health care provider. This is important.  · For more information about diabetes, visit:  ? American Diabetes Association (ADA): www.diabetes.org  ? American Association of Diabetes Educators (AADE): www.diabeteseducator.org  Contact a health care provider if:  · Your blood glucose is at or above 240 mg/dL (13.3 mmol/L) for 2 days in a row.  · You have been sick or have had a fever for 2 days or longer, and you are not getting better.  · You have any of the following problems for more than 6 hours:  ? You cannot eat or drink.  ? You have nausea and vomiting.  ? You have diarrhea.  Get help right away if:  · Your blood glucose is lower than 54 mg/dL (3.0 mmol/L).  · You become confused or you have trouble thinking clearly.  · You have difficulty  breathing.  · You have moderate or large ketone levels in your urine.  Summary  · Type 2 diabetes (type 2 diabetes mellitus) is a long-term (chronic) disease. In type 2 diabetes, the pancreas does not make enough of a hormone called insulin, or cells in the body do not respond properly to insulin that the body makes (insulin resistance).  · This condition is treated by making diet and lifestyle changes and taking diabetes medicines or insulin.  · Your health care provider will set individualized treatment goals for you. Your goals will be based on your age, other medical conditions you have, and how you respond to diabetes treatment.  · Keep all follow-up visits as told by your health care provider. This is important.  This information is not intended to replace advice given to you by your health care provider. Make sure you discuss any questions you have with your health care provider.  Document Revised: 02/15/2019 Document Reviewed: 01/20/2017  [employer] Patient Education © 2020 [employer]

## 2020-12-14 NOTE — PROGRESS NOTES
Subjective cc: HTN  Saman Ovalles is a 70 y.o. male who presents for follow up HTN and HLD and to review labs.    No acute complaints today.      HLD: on statin, fibrate and fish oil, several years, lipids could be better, triglycerides are always high, discussed changing simvastatin to lipitor   No MI, no CVA   HTN: chronic, controlled. losartan/HCTZ in AM, losartan QHS, atenolol - for irregular HR  Vit d def: on supplement  Low testosterone - on gel daily for about 15 years, no known prostate CA, no FH of prostate CA, denies urinary symptoms. Reports he has not been able to get enough of the gel to last him the full 3 months - he is using 6 pumps daily total - the last rx was only written for 1 pump daily.   Depression: working well on Effexor, 25 years   Labs show decreased GFR - reviewed with pt today   His glucose was 192 - denies prior dx of diabetes.       No prior surgeries   Non smoker, former smoker when teenager - quit at age 20, maybe smoked 3-5 years   Alcohol use every other day, drinks beer, 6 every other day   No illicit drug use   Retired - worked for government     FH: father with CAD -  from bypass - age 62 when he passed away.   Sister with breast cancer   No known colon cancer     Pt denies any symptoms     Colonoscopy: 4 years ago - done at [facility], normal per pt - reviewed reports - repeat in 10 years      Past information reviewed and updated as appropriate     Hypertension  This is a chronic problem. The current episode started more than 1 year ago. The problem is unchanged. The problem is controlled. Pertinent negatives include no anxiety, blurred vision, chest pain, headaches, malaise/fatigue, neck pain, orthopnea, palpitations, peripheral edema, PND, shortness of breath or sweats. Risk factors for coronary artery disease include male gender and dyslipidemia. Current antihypertension treatment includes angiotensin blockers, diuretics and beta blockers. The current treatment  provides significant improvement. Compliance problems include diet, exercise and psychosocial issues.  Hypertensive end-organ damage includes kidney disease. There is no history of angina, CAD/MI, CVA, heart failure or left ventricular hypertrophy. There is no history of sleep apnea or a thyroid problem.   Hyperlipidemia  This is a chronic problem. The current episode started more than 1 year ago. The problem is uncontrolled. Recent lipid tests were reviewed and are high. He has no history of hypothyroidism, liver disease, obesity or nephrotic syndrome. Factors aggravating his hyperlipidemia include fatty foods and beta blockers. Associated symptoms include myalgias. Pertinent negatives include no chest pain, focal sensory loss, focal weakness, leg pain or shortness of breath. Current antihyperlipidemic treatment includes diet change, exercise, fibric acid derivatives and statins. The current treatment provides mild improvement of lipids. Compliance problems include adherence to diet and adherence to exercise.  Risk factors for coronary artery disease include dyslipidemia, male sex and hypertension.        The following portions of the patient's history were reviewed and updated as appropriate: allergies, current medications, past family history, past medical history, past social history, past surgical history and problem list.        Review of Systems   Constitutional: Negative for activity change, appetite change, fatigue, fever, malaise/fatigue and unexpected weight change.   Eyes: Negative for blurred vision.   Respiratory: Negative for cough and shortness of breath.    Cardiovascular: Negative for chest pain, palpitations, orthopnea, leg swelling and PND.   Gastrointestinal: Negative for abdominal distention, abdominal pain, blood in stool, constipation, diarrhea, nausea and vomiting.   Genitourinary: Negative for difficulty urinating, dysuria, flank pain, frequency and hematuria.   Musculoskeletal: Positive  "for myalgias. Negative for neck pain.   Neurological: Negative for focal weakness and headaches.   Psychiatric/Behavioral:        Controlled    All other systems reviewed and are negative.      Objective   Blood pressure 142/80, pulse 72, temperature 97.1 °F (36.2 °C), temperature source Infrared, resp. rate 16, height 175.3 cm (69\"), weight 82.5 kg (181 lb 12.8 oz), SpO2 98 %.  Physical Exam   Constitutional: He is oriented to person, place, and time. He appears well-developed. No distress.   HENT:   Head: Normocephalic and atraumatic.   Right Ear: External ear normal.   Left Ear: External ear normal.   Nose: Nose normal.   Eyes: Conjunctivae are normal. Right eye exhibits no discharge. Left eye exhibits no discharge.   Neck: Normal range of motion. No tracheal deviation present. No thyromegaly present.   Cardiovascular: Normal rate, regular rhythm and normal heart sounds.   Pulmonary/Chest: Effort normal and breath sounds normal. No stridor. No respiratory distress. He has no wheezes. He exhibits no tenderness.   Abdominal: Soft. Bowel sounds are normal. He exhibits no distension. There is no abdominal tenderness.   Musculoskeletal: Normal range of motion.   Lymphadenopathy:     He has no cervical adenopathy.   Neurological: He is alert and oriented to person, place, and time. He exhibits normal muscle tone. Coordination normal.   Skin: Skin is warm and dry. He is not diaphoretic.   Psychiatric: His behavior is normal. Judgment and thought content normal.   Nursing note and vitals reviewed.    Lab Results (last 24 hours)     Procedure Component Value Units Date/Time    POC Glycosylated Hemoglobin (Hb A1C) [[id number]]  (Abnormal) Collected: 12/14/20 1518    Specimen: Blood Updated: 12/14/20 1519     Hemoglobin A1C 6.4 %             Assessment/Plan   Problems Addressed this Visit        Cardiovascular and Mediastinum    Hypertriglyceridemia - Primary    Relevant Medications    atorvastatin (Lipitor) 40 MG tablet    " Essential hypertension    Relevant Medications    atenolol (TENORMIN) 50 MG tablet    losartan (Cozaar) 50 MG tablet       Endocrine    Hypogonadism in male    Relevant Medications    Testosterone 10 MG/ACT (2%) gel    Pre-diabetes       Hematopoietic and Hemostatic    Elevated hemoglobin (CMS/HCC)    Relevant Orders    CBC (No Diff)       Other    Anxiety and depression    Relevant Medications    venlafaxine XR (EFFEXOR-XR) 75 MG 24 hr capsule    Overweight    Decreased GFR      Other Visit Diagnoses     Elevated glucose        Relevant Orders    POC Glycosylated Hemoglobin (Hb A1C) (Completed)      Diagnoses       Codes Comments    Hypertriglyceridemia    -  Primary ICD-10-CM: E78.1  ICD-9-CM: 272.1     Elevated glucose     ICD-10-CM: R73.09  ICD-9-CM: 790.29     Elevated hemoglobin (CMS/HCC)     ICD-10-CM: D58.2  ICD-9-CM: 282.7     Pre-diabetes     ICD-10-CM: R73.03  ICD-9-CM: 790.29     Decreased GFR     ICD-10-CM: R94.4  ICD-9-CM: 794.4     Overweight     ICD-10-CM: E66.3  ICD-9-CM: 278.02     Hypogonadism in male     ICD-10-CM: E29.1  ICD-9-CM: 257.2     Essential hypertension     ICD-10-CM: I10  ICD-9-CM: 401.9     Anxiety and depression     ICD-10-CM: F41.9, F32.9  ICD-9-CM: 300.00, 311           PLAN:     #1 HLD: chronic for pt, uncontrolled, change statin, reviewed labs     #2 HTN: controlled, chronic for pt, continue on current medciation    #3 hypogonadism: chronic for pt, reviewed labs, conitnue on testosterone - rx changed to give full 3 month supply - may have to decrease dose if H/H still high     #4 anxiety and depression:  chronic for pt, controlled, continue on effexor     #5 overweight: Patient's Body mass index is 26.85 kg/m². BMI is above normal parameters. Recommendations include: educational material, exercise counseling and nutrition counseling.    #6 pre-DM: new, will get ha1c - advised on lifestlye changes    #7 decreased GFR:  repeat labs          This document has been electronically  signed by Dawna Daniels MD on December 14, 2020 18:03 CST

## 2020-12-19 ENCOUNTER — RESULTS ENCOUNTER (OUTPATIENT)
Dept: FAMILY MEDICINE CLINIC | Facility: CLINIC | Age: 70
End: 2020-12-19

## 2020-12-19 DIAGNOSIS — D58.2 ELEVATED HEMOGLOBIN (HCC): ICD-10-CM

## 2021-01-15 DIAGNOSIS — E29.1 HYPOGONADISM IN MALE: ICD-10-CM

## 2021-01-15 RX ORDER — TESTOSTERONE 10 MG/.5G
GEL, METERED TOPICAL
Qty: 240 G | Refills: 0 | Status: SHIPPED | OUTPATIENT
Start: 2021-01-15 | End: 2021-04-29 | Stop reason: SDUPTHER

## 2021-02-14 DIAGNOSIS — E78.1 HYPERTRIGLYCERIDEMIA: ICD-10-CM

## 2021-02-15 RX ORDER — FENOFIBRATE 160 MG/1
TABLET ORAL
Qty: 90 TABLET | Refills: 0 | Status: SHIPPED | OUTPATIENT
Start: 2021-02-15 | End: 2021-04-29

## 2021-03-08 ENCOUNTER — TELEPHONE (OUTPATIENT)
Dept: FAMILY MEDICINE CLINIC | Facility: CLINIC | Age: 71
End: 2021-03-08

## 2021-03-08 NOTE — TELEPHONE ENCOUNTER
Caller: Saman Ovalles    Relationship: Self    Best call back number: [phone]    What orders are you requesting (i.e. lab or imaging):   Lab orders for 03/15/21 OV    In what timeframe would the patient need to come in:   By 03/09/21     Where will you receive your lab/imaging services:   [facility]    Additional notes:   n/a

## 2021-03-09 NOTE — TELEPHONE ENCOUNTER
CBC, CMP - please fax to [facility] to be drawn - will need to do ha1c when here because of timing

## 2021-03-11 DIAGNOSIS — D58.2 ELEVATED HEMOGLOBIN (HCC): ICD-10-CM

## 2021-03-11 DIAGNOSIS — R73.03 PRE-DIABETES: Primary | ICD-10-CM

## 2021-03-11 NOTE — TELEPHONE ENCOUNTER
Orders placed and faxed to [facility] lab.  Attempted to notify patient. Message left.

## 2021-03-15 ENCOUNTER — OFFICE VISIT (OUTPATIENT)
Dept: FAMILY MEDICINE CLINIC | Facility: CLINIC | Age: 71
End: 2021-03-15

## 2021-03-15 VITALS
SYSTOLIC BLOOD PRESSURE: 138 MMHG | HEIGHT: 69 IN | RESPIRATION RATE: 16 BRPM | TEMPERATURE: 98.4 F | BODY MASS INDEX: 26.63 KG/M2 | WEIGHT: 179.8 LBS | DIASTOLIC BLOOD PRESSURE: 74 MMHG

## 2021-03-15 DIAGNOSIS — F32.A ANXIETY AND DEPRESSION: ICD-10-CM

## 2021-03-15 DIAGNOSIS — E78.1 HYPERTRIGLYCERIDEMIA: Primary | ICD-10-CM

## 2021-03-15 DIAGNOSIS — E29.1 HYPOGONADISM IN MALE: ICD-10-CM

## 2021-03-15 DIAGNOSIS — I10 ESSENTIAL HYPERTENSION: ICD-10-CM

## 2021-03-15 DIAGNOSIS — R94.4 DECREASED GFR: ICD-10-CM

## 2021-03-15 DIAGNOSIS — F41.9 ANXIETY AND DEPRESSION: ICD-10-CM

## 2021-03-15 DIAGNOSIS — Z11.59 NEED FOR HEPATITIS C SCREENING TEST: ICD-10-CM

## 2021-03-15 DIAGNOSIS — M25.551 CHRONIC RIGHT HIP PAIN: ICD-10-CM

## 2021-03-15 DIAGNOSIS — E66.3 OVERWEIGHT: ICD-10-CM

## 2021-03-15 DIAGNOSIS — G89.29 CHRONIC RIGHT HIP PAIN: ICD-10-CM

## 2021-03-15 DIAGNOSIS — R73.03 PRE-DIABETES: ICD-10-CM

## 2021-03-15 PROCEDURE — 99214 OFFICE O/P EST MOD 30 MIN: CPT | Performed by: FAMILY MEDICINE

## 2021-03-15 RX ORDER — LOSARTAN POTASSIUM 50 MG/1
50 TABLET ORAL DAILY
Qty: 90 TABLET | Refills: 3 | Status: SHIPPED | OUTPATIENT
Start: 2021-03-15 | End: 2022-05-19

## 2021-03-15 RX ORDER — LOSARTAN POTASSIUM AND HYDROCHLOROTHIAZIDE 12.5; 5 MG/1; MG/1
1 TABLET ORAL DAILY
Qty: 90 TABLET | Refills: 3 | Status: SHIPPED | OUTPATIENT
Start: 2021-03-15 | End: 2022-05-19

## 2021-03-15 NOTE — PROGRESS NOTES
Subjective cc: HTN  Saman Ovalles is a 70 y.o. male who presents for follow up HTN and HLD and to review labs.    Complains of right hip pain - pain will fluctuate. Walking is okay but going up hills is hard.  Does radiate down to his right knee at times - reviewed XR from 2020 - bilateral OA in hips    HLD: on statin, fibrate and fish oil, several years, lipids could be better, triglycerides are always high, changed to lipitor   No MI, no CVA   HTN: chronic, controlled. losartan/HCTZ in AM, losartan QHS, atenolol - for irregular HR  Vit d def: on supplement  Low testosterone - on gel daily for about 15 years, no known prostate CA, no FH of prostate CA, denies urinary symptoms. he is using 6 pumps daily total   Depression: working well on Effexor, 25 years   Labs show decreased GFR - reviewed with pt today   Pre-DM with ha1c 6.4     No prior surgeries   Non smoker, former smoker when teenager - quit at age 20, maybe smoked 3-5 years   Alcohol use every other day, drinks beer, 6 every other day   No illicit drug use   Retired - worked for government     FH: father with CAD -  from bypass - age 62 when he passed away.   Sister with breast cancer   No known colon cancer     Pt denies any symptoms     Colonoscopy: 4 years ago - done at [facility], normal per pt - reviewed reports - repeat in 10 years    Past information reviewed and updated as appropriate     Hypertension  This is a chronic problem. The current episode started more than 1 year ago. The problem is unchanged. The problem is controlled. Pertinent negatives include no anxiety, blurred vision, chest pain, headaches, malaise/fatigue, neck pain, orthopnea, palpitations, peripheral edema, PND, shortness of breath or sweats. Risk factors for coronary artery disease include male gender and dyslipidemia. Current antihypertension treatment includes angiotensin blockers, diuretics and beta blockers. The current treatment provides significant improvement.  Compliance problems include diet, exercise and psychosocial issues.  Hypertensive end-organ damage includes kidney disease. There is no history of angina, CAD/MI, CVA, heart failure or left ventricular hypertrophy. There is no history of sleep apnea or a thyroid problem.   Hyperlipidemia  This is a chronic problem. The current episode started more than 1 year ago. The problem is uncontrolled. Recent lipid tests were reviewed and are high. He has no history of hypothyroidism, liver disease, obesity or nephrotic syndrome. Factors aggravating his hyperlipidemia include fatty foods and beta blockers. Associated symptoms include myalgias. Pertinent negatives include no chest pain, focal sensory loss, focal weakness, leg pain or shortness of breath. Current antihyperlipidemic treatment includes diet change, exercise, fibric acid derivatives and statins. The current treatment provides mild improvement of lipids. Compliance problems include adherence to diet and adherence to exercise.  Risk factors for coronary artery disease include dyslipidemia, male sex and hypertension.        The following portions of the patient's history were reviewed and updated as appropriate: allergies, current medications, past family history, past medical history, past social history, past surgical history and problem list.        Review of Systems   Constitutional: Negative for activity change, appetite change, fatigue, fever, malaise/fatigue and unexpected weight change.   Eyes: Negative for blurred vision.   Respiratory: Negative for cough and shortness of breath.    Cardiovascular: Negative for chest pain, palpitations, orthopnea, leg swelling and PND.   Gastrointestinal: Negative for abdominal distention, abdominal pain, blood in stool, constipation, diarrhea, nausea and vomiting.   Genitourinary: Negative for difficulty urinating, dysuria, flank pain, frequency and hematuria.   Musculoskeletal: Positive for arthralgias, gait problem and  "myalgias. Negative for neck pain.   Neurological: Negative for focal weakness and headaches.   Psychiatric/Behavioral:        Controlled    All other systems reviewed and are negative.      Objective   Blood pressure 138/74, temperature 98.4 °F (36.9 °C), temperature source Infrared, resp. rate 16, height 175.3 cm (69\"), weight 81.6 kg (179 lb 12.8 oz).  Physical Exam   Constitutional: He is oriented to person, place, and time. He appears well-developed. No distress.   HENT:   Head: Normocephalic and atraumatic.   Right Ear: External ear normal.   Left Ear: External ear normal.   Nose: Nose normal.   Eyes: Conjunctivae are normal. Right eye exhibits no discharge. Left eye exhibits no discharge.   Neck: No tracheal deviation present. No thyromegaly present.   Cardiovascular: Normal rate, regular rhythm and normal heart sounds.   Pulmonary/Chest: Effort normal and breath sounds normal. No stridor. No respiratory distress. He has no wheezes. He exhibits no tenderness.   Abdominal: Soft. Bowel sounds are normal. He exhibits no distension. There is no abdominal tenderness.   Musculoskeletal: Normal range of motion.   Lymphadenopathy:     He has no cervical adenopathy.   Neurological: He is alert and oriented to person, place, and time. He exhibits normal muscle tone. Coordination normal.   Skin: Skin is warm and dry. He is not diaphoretic.   Psychiatric: His behavior is normal. Judgment and thought content normal.   Nursing note and vitals reviewed.    Lab Results (last 24 hours)     ** No results found for the last 24 hours. **            Assessment/Plan   Problems Addressed this Visit        Cardiac and Vasculature    Hypertriglyceridemia - Primary    Essential hypertension    Relevant Medications    losartan-hydrochlorothiazide (HYZAAR) 50-12.5 MG per tablet    losartan (Cozaar) 50 MG tablet       Endocrine and Metabolic    Hypogonadism in male    Relevant Orders    Testosterone    Overweight    Pre-diabetes    " Relevant Orders    Hemoglobin A1c       Genitourinary and Reproductive     Decreased GFR       Mental Health    Anxiety and depression      Other Visit Diagnoses     Need for hepatitis C screening test        Relevant Orders    Hepatitis C Antibody    Chronic right hip pain        Relevant Medications    Diclofenac Sodium (VOLTAREN) 1 % gel gel      Diagnoses       Codes Comments    Hypertriglyceridemia    -  Primary ICD-10-CM: E78.1  ICD-9-CM: 272.1     Essential hypertension     ICD-10-CM: I10  ICD-9-CM: 401.9     Hypogonadism in male     ICD-10-CM: E29.1  ICD-9-CM: 257.2     Pre-diabetes     ICD-10-CM: R73.03  ICD-9-CM: 790.29     Overweight     ICD-10-CM: E66.3  ICD-9-CM: 278.02     Decreased GFR     ICD-10-CM: R94.4  ICD-9-CM: 794.4     Anxiety and depression     ICD-10-CM: F41.9, F32.9  ICD-9-CM: 300.00, 311     Need for hepatitis C screening test     ICD-10-CM: Z11.59  ICD-9-CM: V73.89     Chronic right hip pain     ICD-10-CM: M25.551, G89.29  ICD-9-CM: 719.45, 338.29           PLAN:     #1 HLD: chronic for pt, uncontrolled, continue on statin, reviewed labs     #2 HTN: controlled, chronic for pt, continue on current medication    #3 hypogonadism: chronic for pt, reviewed labs, conitnue on testosterone - may have to decrease dose if H/H still high     #4 anxiety and depression:  chronic for pt, controlled, continue on effexor     #5 overweight: Patient's Body mass index is 26.55 kg/m². BMI is above normal parameters. Recommendations include: educational material, exercise counseling and nutrition counseling.    #6 pre-DM: new, will get ha1c - advised on lifestyle changes    #7 decreased GFR:  repeat labs    #8 right hip pain: chronic, reviewed XR, discussed treatment options, will trial tylenol OTC first           This document has been electronically signed by Dawna Daniels MD on March 15, 2021 15:30 CDT

## 2021-03-15 NOTE — PATIENT INSTRUCTIONS
Type 2 Diabetes Mellitus, Diagnosis, Adult  Type 2 diabetes (type 2 diabetes mellitus) is a long-term (chronic) disease. In type 2 diabetes, one or both of these problems may be present:  · The pancreas does not make enough of a hormone called insulin.  · Cells in the body do not respond properly to insulin that the body makes (insulin resistance).  Normally, insulin allows blood sugar (glucose) to enter cells in the body. The cells use glucose for energy. Insulin resistance or lack of insulin causes excess glucose to build up in the blood instead of going into cells. As a result, high blood glucose (hyperglycemia) develops.  What increases the risk?  The following factors may make you more likely to develop type 2 diabetes:  · Having a family member with type 2 diabetes.  · Being overweight or obese.  · Having an inactive (sedentary) lifestyle.  · Having been diagnosed with insulin resistance.  · Having a history of prediabetes, gestational diabetes, or polycystic ovary syndrome (PCOS).  · Being of American-[place], -American, /, or / descent.  What are the signs or symptoms?  In the early stage of this condition, you may not have symptoms. Symptoms develop slowly and may include:  · Increased thirst (polydipsia).  · Increased hunger (polyphagia).  · Increased urination (polyuria).  · Increased urination during the night (nocturia).  · Unexplained weight loss.  · Frequent infections that keep coming back (recurring).  · Fatigue.  · Weakness.  · Vision changes, such as blurry vision.  · Cuts or bruises that are slow to heal.  · Tingling or numbness in the hands or feet.  · Dark patches on the skin (acanthosis nigricans).  How is this diagnosed?  This condition is diagnosed based on your symptoms, your medical history, a physical exam, and your blood glucose level. Your blood glucose may be checked with one or more of the following blood tests:  · A fasting blood glucose (FBG)  test. You will not be allowed to eat (you will fast) for 8 hours or longer before a blood sample is taken.  · A random blood glucose test. This test checks blood glucose at any time of day regardless of when you ate.  · An A1c (hemoglobin A1c) blood test. This test provides information about blood glucose control over the previous 2-3 months.  · An oral glucose tolerance test (OGTT). This test measures your blood glucose at two times:  ? After fasting. This is your baseline blood glucose level.  ? Two hours after drinking a beverage that contains glucose.  You may be diagnosed with type 2 diabetes if:  · Your FBG level is 126 mg/dL (7.0 mmol/L) or higher.  · Your random blood glucose level is 200 mg/dL (11.1 mmol/L) or higher.  · Your A1c level is 6.5% or higher.  · Your OGTT result is higher than 200 mg/dL (11.1 mmol/L).  These blood tests may be repeated to confirm your diagnosis.  How is this treated?  Your treatment may be managed by a specialist called an endocrinologist. Type 2 diabetes may be treated by following instructions from your health care provider about:  · Making diet and lifestyle changes. This may include:  ? Following an individualized nutrition plan that is developed by a diet and nutrition specialist (registered dietitian).  ? Exercising regularly.  ? Finding ways to manage stress.  · Checking your blood glucose level as often as told.  · Taking diabetes medicines or insulin daily. This helps to keep your blood glucose levels in the healthy range.  ? If you use insulin, you may need to adjust the dosage depending on how physically active you are and what foods you eat. Your health care provider will tell you how to adjust your dosage.  · Taking medicines to help prevent complications from diabetes, such as:  ? Aspirin.  ? Medicine to lower cholesterol.  ? Medicine to control blood pressure.  Your health care provider will set individualized treatment goals for you. Your goals will be based on  your age, other medical conditions you have, and how you respond to diabetes treatment. Generally, the goal of treatment is to maintain the following blood glucose levels:  · Before meals (preprandial):  mg/dL (4.4-7.2 mmol/L).  · After meals (postprandial): below 180 mg/dL (10 mmol/L).  · A1c level: less than 7%.  Follow these instructions at home:  Questions to ask your health care provider  · Consider asking the following questions:  ? Do I need to meet with a diabetes educator?  ? Where can I find a support group for people with diabetes?  ? What equipment will I need to manage my diabetes at home?  ? What diabetes medicines do I need, and when should I take them?  ? How often do I need to check my blood glucose?  ? What number can I call if I have questions?  ? When is my next appointment?  General instructions  · Take over-the-counter and prescription medicines only as told by your health care provider.  · Keep all follow-up visits as told by your health care provider. This is important.  · For more information about diabetes, visit:  ? American Diabetes Association (ADA): www.diabetes.org  ? American Association of Diabetes Educators (AADE): www.diabeteseducator.org  Contact a health care provider if:  · Your blood glucose is at or above 240 mg/dL (13.3 mmol/L) for 2 days in a row.  · You have been sick or have had a fever for 2 days or longer, and you are not getting better.  · You have any of the following problems for more than 6 hours:  ? You cannot eat or drink.  ? You have nausea and vomiting.  ? You have diarrhea.  Get help right away if:  · Your blood glucose is lower than 54 mg/dL (3.0 mmol/L).  · You become confused or you have trouble thinking clearly.  · You have difficulty breathing.  · You have moderate or large ketone levels in your urine.  Summary  · Type 2 diabetes (type 2 diabetes mellitus) is a long-term (chronic) disease. In type 2 diabetes, the pancreas does not make enough of a  "hormone called insulin, or cells in the body do not respond properly to insulin that the body makes (insulin resistance).  · This condition is treated by making diet and lifestyle changes and taking diabetes medicines or insulin.  · Your health care provider will set individualized treatment goals for you. Your goals will be based on your age, other medical conditions you have, and how you respond to diabetes treatment.  · Keep all follow-up visits as told by your health care provider. This is important.  This information is not intended to replace advice given to you by your health care provider. Make sure you discuss any questions you have with your health care provider.  Document Revised: 02/15/2019 Document Reviewed: 01/20/2017  [employer] Patient Education © 2020 [employer]      Hypertension, Adult  High blood pressure (hypertension) is when the force of blood pumping through the arteries is too strong. The arteries are the blood vessels that carry blood from the heart throughout the body. Hypertension forces the heart to work harder to pump blood and may cause arteries to become narrow or stiff. Untreated or uncontrolled hypertension can cause a heart attack, heart failure, a stroke, kidney disease, and other problems.  A blood pressure reading consists of a higher number over a lower number. Ideally, your blood pressure should be below 120/80. The first (\"top\") number is called the systolic pressure. It is a measure of the pressure in your arteries as your heart beats. The second (\"bottom\") number is called the diastolic pressure. It is a measure of the pressure in your arteries as the heart relaxes.  What are the causes?  The exact cause of this condition is not known. There are some conditions that result in or are related to high blood pressure.  What increases the risk?  Some risk factors for high blood pressure are under your control. The following factors may make you more likely to develop this " condition:  · Smoking.  · Having type 2 diabetes mellitus, high cholesterol, or both.  · Not getting enough exercise or physical activity.  · Being overweight.  · Having too much fat, sugar, calories, or salt (sodium) in your diet.  · Drinking too much alcohol.  Some risk factors for high blood pressure may be difficult or impossible to change. Some of these factors include:  · Having chronic kidney disease.  · Having a family history of high blood pressure.  · Age. Risk increases with age.  · Race. You may be at higher risk if you are .  · Gender. Men are at higher risk than women before age 45. After age 65, women are at higher risk than men.  · Having obstructive sleep apnea.  · Stress.  What are the signs or symptoms?  High blood pressure may not cause symptoms. Very high blood pressure (hypertensive crisis) may cause:  · Headache.  · Anxiety.  · Shortness of breath.  · Nosebleed.  · Nausea and vomiting.  · Vision changes.  · Severe chest pain.  · Seizures.  How is this diagnosed?  This condition is diagnosed by measuring your blood pressure while you are seated, with your arm resting on a flat surface, your legs uncrossed, and your feet flat on the floor. The cuff of the blood pressure monitor will be placed directly against the skin of your upper arm at the level of your heart. It should be measured at least twice using the same arm. Certain conditions can cause a difference in blood pressure between your right and left arms.  Certain factors can cause blood pressure readings to be lower or higher than normal for a short period of time:  · When your blood pressure is higher when you are in a health care provider's office than when you are at home, this is called white coat hypertension. Most people with this condition do not need medicines.  · When your blood pressure is higher at home than when you are in a health care provider's office, this is called masked hypertension. Most people with  this condition may need medicines to control blood pressure.  If you have a high blood pressure reading during one visit or you have normal blood pressure with other risk factors, you may be asked to:  · Return on a different day to have your blood pressure checked again.  · Monitor your blood pressure at home for 1 week or longer.  If you are diagnosed with hypertension, you may have other blood or imaging tests to help your health care provider understand your overall risk for other conditions.  How is this treated?  This condition is treated by making healthy lifestyle changes, such as eating healthy foods, exercising more, and reducing your alcohol intake. Your health care provider may prescribe medicine if lifestyle changes are not enough to get your blood pressure under control, and if:  · Your systolic blood pressure is above 130.  · Your diastolic blood pressure is above 80.  Your personal target blood pressure may vary depending on your medical conditions, your age, and other factors.  Follow these instructions at home:  Eating and drinking    · Eat a diet that is high in fiber and potassium, and low in sodium, added sugar, and fat. An example eating plan is called the DASH (Dietary Approaches to Stop Hypertension) diet. To eat this way:  ? Eat plenty of fresh fruits and vegetables. Try to fill one half of your plate at each meal with fruits and vegetables.  ? Eat whole grains, such as whole-wheat pasta, brown rice, or whole-grain bread. Fill about one fourth of your plate with whole grains.  ? Eat or drink low-fat dairy products, such as skim milk or low-fat yogurt.  ? Avoid fatty cuts of meat, processed or cured meats, and poultry with skin. Fill about one fourth of your plate with lean proteins, such as fish, chicken without skin, beans, eggs, or tofu.  ? Avoid pre-made and processed foods. These tend to be higher in sodium, added sugar, and fat.  · Reduce your daily sodium intake. Most people with  hypertension should eat less than 1,500 mg of sodium a day.  · Do not drink alcohol if:  ? Your health care provider tells you not to drink.  ? You are pregnant, may be pregnant, or are planning to become pregnant.  · If you drink alcohol:  ? Limit how much you use to:  § 0-1 drink a day for women.  § 0-2 drinks a day for men.  ? Be aware of how much alcohol is in your drink. In the [place] one drink equals one 12 oz bottle of beer (355 mL), one 5 oz glass of wine (148 mL), or one 1½ oz glass of hard liquor (44 mL).  Lifestyle    · Work with your health care provider to maintain a healthy body weight or to lose weight. Ask what an ideal weight is for you.  · Get at least 30 minutes of exercise most days of the week. Activities may include walking, swimming, or biking.  · Include exercise to strengthen your muscles (resistance exercise), such as Pilates or lifting weights, as part of your weekly exercise routine. Try to do these types of exercises for 30 minutes at least 3 days a week.  · Do not use any products that contain nicotine or tobacco, such as cigarettes, e-cigarettes, and chewing tobacco. If you need help quitting, ask your health care provider.  · Monitor your blood pressure at home as told by your health care provider.  · Keep all follow-up visits as told by your health care provider. This is important.  Medicines  · Take over-the-counter and prescription medicines only as told by your health care provider. Follow directions carefully. Blood pressure medicines must be taken as prescribed.  · Do not skip doses of blood pressure medicine. Doing this puts you at risk for problems and can make the medicine less effective.  · Ask your health care provider about side effects or reactions to medicines that you should watch for.  Contact a health care provider if you:  · Think you are having a reaction to a medicine you are taking.  · Have headaches that keep coming back (recurring).  · Feel dizzy.  · Have  "swelling in your ankles.  · Have trouble with your vision.  Get help right away if you:  · Develop a severe headache or confusion.  · Have unusual weakness or numbness.  · Feel faint.  · Have severe pain in your chest or abdomen.  · Vomit repeatedly.  · Have trouble breathing.  Summary  · Hypertension is when the force of blood pumping through your arteries is too strong. If this condition is not controlled, it may put you at risk for serious complications.  · Your personal target blood pressure may vary depending on your medical conditions, your age, and other factors. For most people, a normal blood pressure is less than 120/80.  · Hypertension is treated with lifestyle changes, medicines, or a combination of both. Lifestyle changes include losing weight, eating a healthy, low-sodium diet, exercising more, and limiting alcohol.  This information is not intended to replace advice given to you by your health care provider. Make sure you discuss any questions you have with your health care provider.  Document Revised: 08/28/2019 Document Reviewed: 08/28/2019  [employer] Patient Education © 2020 [employer]      DASH Eating Plan  DASH stands for \"Dietary Approaches to Stop Hypertension.\" The DASH eating plan is a healthy eating plan that has been shown to reduce high blood pressure (hypertension). It may also reduce your risk for type 2 diabetes, heart disease, and stroke. The DASH eating plan may also help with weight loss.  What are tips for following this plan?    General guidelines  · Avoid eating more than 2,300 mg (milligrams) of salt (sodium) a day. If you have hypertension, you may need to reduce your sodium intake to 1,500 mg a day.  · Limit alcohol intake to no more than 1 drink a day for nonpregnant women and 2 drinks a day for men. One drink equals 12 oz of beer, 5 oz of wine, or 1½ oz of hard liquor.  · Work with your health care provider to maintain a healthy body weight or to lose weight. Ask what an " "ideal weight is for you.  · Get at least 30 minutes of exercise that causes your heart to beat faster (aerobic exercise) most days of the week. Activities may include walking, swimming, or biking.  · Work with your health care provider or diet and nutrition specialist (dietitian) to adjust your eating plan to your individual calorie needs.  Reading food labels    · Check food labels for the amount of sodium per serving. Choose foods with less than 5 percent of the Daily Value of sodium. Generally, foods with less than 300 mg of sodium per serving fit into this eating plan.  · To find whole grains, look for the word \"whole\" as the first word in the ingredient list.  Shopping  · Buy products labeled as \"low-sodium\" or \"no salt added.\"  · Buy fresh foods. Avoid canned foods and premade or frozen meals.  Cooking  · Avoid adding salt when cooking. Use salt-free seasonings or herbs instead of table salt or sea salt. Check with your health care provider or pharmacist before using salt substitutes.  · Do not henao foods. Cook foods using healthy methods such as baking, boiling, grilling, and broiling instead.  · Cook with heart-healthy oils, such as olive, canola, soybean, or sunflower oil.  Meal planning  · Eat a balanced diet that includes:  ? 5 or more servings of fruits and vegetables each day. At each meal, try to fill half of your plate with fruits and vegetables.  ? Up to 6-8 servings of whole grains each day.  ? Less than 6 oz of lean meat, poultry, or fish each day. A 3-oz serving of meat is about the same size as a deck of cards. One egg equals 1 oz.  ? 2 servings of low-fat dairy each day.  ? A serving of nuts, seeds, or beans 5 times each week.  ? Heart-healthy fats. Healthy fats called Omega-3 fatty acids are found in foods such as flaxseeds and coldwater fish, like sardines, salmon, and mackerel.  · Limit how much you eat of the following:  ? Canned or prepackaged foods.  ? Food that is high in trans fat, such " as fried foods.  ? Food that is high in saturated fat, such as fatty meat.  ? Sweets, desserts, sugary drinks, and other foods with added sugar.  ? Full-fat dairy products.  · Do not salt foods before eating.  · Try to eat at least 2 vegetarian meals each week.  · Eat more home-cooked food and less restaurant, buffet, and fast food.  · When eating at a restaurant, ask that your food be prepared with less salt or no salt, if possible.  What foods are recommended?  The items listed may not be a complete list. Talk with your dietitian about what dietary choices are best for you.  Grains  Whole-grain or whole-wheat bread. Whole-grain or whole-wheat pasta. Brown rice. Oatmeal. Quinoa. Bulgur. Whole-grain and low-sodium cereals. Olga Lidia bread. Low-fat, low-sodium crackers. Whole-wheat flour tortillas.  Vegetables  Fresh or frozen vegetables (raw, steamed, roasted, or grilled). Low-sodium or reduced-sodium tomato and vegetable juice. Low-sodium or reduced-sodium tomato sauce and tomato paste. Low-sodium or reduced-sodium canned vegetables.  Fruits  All fresh, dried, or frozen fruit. Canned fruit in natural juice (without added sugar).  Meat and other protein foods  Skinless chicken or turkey. Ground chicken or turkey. Pork with fat trimmed off. Fish and seafood. Egg whites. Dried beans, peas, or lentils. Unsalted nuts, nut butters, and seeds. Unsalted canned beans. Lean cuts of beef with fat trimmed off. Low-sodium, lean deli meat.  Dairy  Low-fat (1%) or fat-free (skim) milk. Fat-free, low-fat, or reduced-fat cheeses. Nonfat, low-sodium ricotta or cottage cheese. Low-fat or nonfat yogurt. Low-fat, low-sodium cheese.  Fats and oils  Soft margarine without trans fats. Vegetable oil. Low-fat, reduced-fat, or light mayonnaise and salad dressings (reduced-sodium). Canola, safflower, olive, soybean, and sunflower oils. Avocado.  Seasoning and other foods  Herbs. Spices. Seasoning mixes without salt. Unsalted popcorn and pretzels.  Fat-free sweets.  What foods are not recommended?  The items listed may not be a complete list. Talk with your dietitian about what dietary choices are best for you.  Grains  Baked goods made with fat, such as croissants, muffins, or some breads. Dry pasta or rice meal packs.  Vegetables  Creamed or fried vegetables. Vegetables in a cheese sauce. Regular canned vegetables (not low-sodium or reduced-sodium). Regular canned tomato sauce and paste (not low-sodium or reduced-sodium). Regular tomato and vegetable juice (not low-sodium or reduced-sodium). Pickles. Olives.  Fruits  Canned fruit in a light or heavy syrup. Fried fruit. Fruit in cream or butter sauce.  Meat and other protein foods  Fatty cuts of meat. Ribs. Fried meat. Marie. Sausage. Bologna and other processed lunch meats. Salami. Fatback. Hotdogs. Bratwurst. Salted nuts and seeds. Canned beans with added salt. Canned or smoked fish. Whole eggs or egg yolks. Chicken or turkey with skin.  Dairy  Whole or 2% milk, cream, and half-and-half. Whole or full-fat cream cheese. Whole-fat or sweetened yogurt. Full-fat cheese. Nondairy creamers. Whipped toppings. Processed cheese and cheese spreads.  Fats and oils  Butter. Stick margarine. Lard. Shortening. Ghee. Marie fat. Tropical oils, such as coconut, palm kernel, or palm oil.  Seasoning and other foods  Salted popcorn and pretzels. Onion salt, garlic salt, seasoned salt, table salt, and sea salt. Worcestershire sauce. Tartar sauce. Barbecue sauce. Teriyaki sauce. Soy sauce, including reduced-sodium. Steak sauce. Canned and packaged gravies. Fish sauce. Oyster sauce. Cocktail sauce. Horseradish that you find on the shelf. Ketchup. Mustard. Meat flavorings and tenderizers. Bouillon cubes. Hot sauce and Tabasco sauce. Premade or packaged marinades. Premade or packaged taco seasonings. Relishes. Regular salad dressings.  Where to find more information:  · National Heart, Lung, and Blood [place]:  www.nhlbi.nih.gov  · American Heart Association: www.heart.org  Summary  · The DASH eating plan is a healthy eating plan that has been shown to reduce high blood pressure (hypertension). It may also reduce your risk for type 2 diabetes, heart disease, and stroke.  · With the DASH eating plan, you should limit salt (sodium) intake to 2,300 mg a day. If you have hypertension, you may need to reduce your sodium intake to 1,500 mg a day.  · When on the DASH eating plan, aim to eat more fresh fruits and vegetables, whole grains, lean proteins, low-fat dairy, and heart-healthy fats.  · Work with your health care provider or diet and nutrition specialist (dietitian) to adjust your eating plan to your individual calorie needs.  This information is not intended to replace advice given to you by your health care provider. Make sure you discuss any questions you have with your health care provider.  Document Revised: 11/30/2018 Document Reviewed: 12/11/2017  [employer] Patient Education © 2020 [employer]

## 2021-04-28 ENCOUNTER — TELEPHONE (OUTPATIENT)
Dept: FAMILY MEDICINE CLINIC | Facility: CLINIC | Age: 71
End: 2021-04-28

## 2021-04-28 DIAGNOSIS — E29.1 HYPOGONADISM IN MALE: ICD-10-CM

## 2021-04-28 DIAGNOSIS — E78.1 HYPERTRIGLYCERIDEMIA: ICD-10-CM

## 2021-04-29 RX ORDER — TESTOSTERONE 10 MG/.5G
6 GEL, METERED TOPICAL DAILY
Qty: 240 G | Refills: 0 | Status: SHIPPED | OUTPATIENT
Start: 2021-04-29 | End: 2021-07-16

## 2021-04-29 RX ORDER — FENOFIBRATE 160 MG/1
TABLET ORAL
Qty: 90 TABLET | Refills: 0 | Status: SHIPPED | OUTPATIENT
Start: 2021-04-29 | End: 2021-08-10

## 2021-06-24 ENCOUNTER — OFFICE VISIT (OUTPATIENT)
Dept: FAMILY MEDICINE CLINIC | Facility: CLINIC | Age: 71
End: 2021-06-24

## 2021-06-24 VITALS
OXYGEN SATURATION: 97 % | DIASTOLIC BLOOD PRESSURE: 68 MMHG | SYSTOLIC BLOOD PRESSURE: 117 MMHG | HEIGHT: 69 IN | WEIGHT: 177.2 LBS | RESPIRATION RATE: 16 BRPM | TEMPERATURE: 97.9 F | BODY MASS INDEX: 26.25 KG/M2 | HEART RATE: 63 BPM

## 2021-06-24 DIAGNOSIS — F32.A ANXIETY AND DEPRESSION: ICD-10-CM

## 2021-06-24 DIAGNOSIS — E78.1 HYPERTRIGLYCERIDEMIA: ICD-10-CM

## 2021-06-24 DIAGNOSIS — I10 ESSENTIAL HYPERTENSION: ICD-10-CM

## 2021-06-24 DIAGNOSIS — D58.2 ELEVATED HEMOGLOBIN (HCC): ICD-10-CM

## 2021-06-24 DIAGNOSIS — R73.03 PRE-DIABETES: Primary | ICD-10-CM

## 2021-06-24 DIAGNOSIS — F41.9 ANXIETY AND DEPRESSION: ICD-10-CM

## 2021-06-24 DIAGNOSIS — E29.1 HYPOGONADISM IN MALE: ICD-10-CM

## 2021-06-24 DIAGNOSIS — R94.4 DECREASED GFR: ICD-10-CM

## 2021-06-24 PROCEDURE — 99214 OFFICE O/P EST MOD 30 MIN: CPT | Performed by: FAMILY MEDICINE

## 2021-07-16 DIAGNOSIS — E29.1 HYPOGONADISM IN MALE: ICD-10-CM

## 2021-07-16 RX ORDER — TESTOSTERONE 10 MG/.5G
GEL, METERED TOPICAL
Qty: 240 G | Refills: 0 | Status: SHIPPED | OUTPATIENT
Start: 2021-07-16 | End: 2021-09-28 | Stop reason: SDUPTHER

## 2021-08-09 DIAGNOSIS — E78.1 HYPERTRIGLYCERIDEMIA: ICD-10-CM

## 2021-08-10 RX ORDER — FENOFIBRATE 160 MG/1
TABLET ORAL
Qty: 90 TABLET | Refills: 0 | Status: SHIPPED | OUTPATIENT
Start: 2021-08-10 | End: 2021-09-28 | Stop reason: SDUPTHER

## 2021-09-15 DIAGNOSIS — R94.4 DECREASED GFR: ICD-10-CM

## 2021-09-15 DIAGNOSIS — R73.03 PRE-DIABETES: ICD-10-CM

## 2021-09-15 DIAGNOSIS — D58.2 ELEVATED HEMOGLOBIN (HCC): Primary | ICD-10-CM

## 2021-09-15 DIAGNOSIS — E29.1 HYPOGONADISM IN MALE: ICD-10-CM

## 2021-09-28 ENCOUNTER — OFFICE VISIT (OUTPATIENT)
Dept: FAMILY MEDICINE CLINIC | Facility: CLINIC | Age: 71
End: 2021-09-28

## 2021-09-28 VITALS
HEART RATE: 62 BPM | WEIGHT: 177.8 LBS | HEIGHT: 69 IN | DIASTOLIC BLOOD PRESSURE: 74 MMHG | BODY MASS INDEX: 26.33 KG/M2 | SYSTOLIC BLOOD PRESSURE: 122 MMHG | OXYGEN SATURATION: 98 % | TEMPERATURE: 97.8 F | RESPIRATION RATE: 16 BRPM

## 2021-09-28 DIAGNOSIS — R73.03 PRE-DIABETES: Primary | ICD-10-CM

## 2021-09-28 DIAGNOSIS — F32.A ANXIETY AND DEPRESSION: ICD-10-CM

## 2021-09-28 DIAGNOSIS — F41.9 ANXIETY AND DEPRESSION: ICD-10-CM

## 2021-09-28 DIAGNOSIS — E78.1 HYPERTRIGLYCERIDEMIA: ICD-10-CM

## 2021-09-28 DIAGNOSIS — I10 ESSENTIAL HYPERTENSION: ICD-10-CM

## 2021-09-28 DIAGNOSIS — E29.1 HYPOGONADISM IN MALE: ICD-10-CM

## 2021-09-28 PROCEDURE — 99214 OFFICE O/P EST MOD 30 MIN: CPT | Performed by: FAMILY MEDICINE

## 2021-09-28 RX ORDER — FENOFIBRATE 160 MG/1
160 TABLET ORAL DAILY
Qty: 90 TABLET | Refills: 3 | Status: SHIPPED | OUTPATIENT
Start: 2021-09-28 | End: 2022-11-07

## 2021-09-28 RX ORDER — VENLAFAXINE HYDROCHLORIDE 75 MG/1
75 CAPSULE, EXTENDED RELEASE ORAL DAILY
Qty: 90 CAPSULE | Refills: 3 | Status: SHIPPED | OUTPATIENT
Start: 2021-09-28 | End: 2022-11-16

## 2021-09-28 RX ORDER — TESTOSTERONE 10 MG/.5G
6 GEL, METERED TOPICAL DAILY
Qty: 240 G | Refills: 0 | Status: SHIPPED | OUTPATIENT
Start: 2021-09-28 | End: 2022-01-27

## 2021-09-28 RX ORDER — ATENOLOL 50 MG/1
50 TABLET ORAL DAILY
Qty: 90 TABLET | Refills: 3 | Status: SHIPPED | OUTPATIENT
Start: 2021-09-28 | End: 2022-10-21

## 2021-09-28 RX ORDER — ATORVASTATIN CALCIUM 40 MG/1
40 TABLET, FILM COATED ORAL NIGHTLY
Qty: 90 TABLET | Refills: 3 | Status: SHIPPED | OUTPATIENT
Start: 2021-09-28 | End: 2022-11-16

## 2021-10-04 DIAGNOSIS — R94.4 DECREASED GFR: Primary | ICD-10-CM

## 2022-01-26 DIAGNOSIS — E29.1 HYPOGONADISM IN MALE: ICD-10-CM

## 2022-01-26 NOTE — TELEPHONE ENCOUNTER
Rx Refill Note  Requested Prescriptions     Pending Prescriptions Disp Refills   • Testosterone 10 MG/ACT (2%) gel [Pharmacy Med Name: TESTOST(F) GEL PUM 2%] 240 g 0     Sig: APPLY THE CONTENTS OF 6    PUMP ACTUATIONS ON THE SKINAS DIRECTED BY PROVIDER    DAILY      Last office visit with prescribing clinician: 9/28/2021      Next office visit with prescribing clinician: 3/29/2022   Last Filled 09/28/2021 [id number] no rf           Nicolasa Aviles MA  01/26/22, 16:20 CST

## 2022-01-27 RX ORDER — TESTOSTERONE 10 MG/.5G
GEL, METERED TOPICAL
Qty: 240 G | Refills: 0 | Status: SHIPPED | OUTPATIENT
Start: 2022-01-27 | End: 2022-06-14

## 2022-03-29 ENCOUNTER — OFFICE VISIT (OUTPATIENT)
Dept: FAMILY MEDICINE CLINIC | Facility: CLINIC | Age: 72
End: 2022-03-29

## 2022-03-29 VITALS
HEIGHT: 69 IN | BODY MASS INDEX: 26.51 KG/M2 | RESPIRATION RATE: 16 BRPM | OXYGEN SATURATION: 98 % | DIASTOLIC BLOOD PRESSURE: 78 MMHG | SYSTOLIC BLOOD PRESSURE: 148 MMHG | TEMPERATURE: 98 F | HEART RATE: 68 BPM | WEIGHT: 179 LBS

## 2022-03-29 DIAGNOSIS — R94.4 DECREASED GFR: ICD-10-CM

## 2022-03-29 DIAGNOSIS — E29.1 HYPOGONADISM IN MALE: ICD-10-CM

## 2022-03-29 DIAGNOSIS — E78.1 HYPERTRIGLYCERIDEMIA: ICD-10-CM

## 2022-03-29 DIAGNOSIS — I10 ESSENTIAL HYPERTENSION: Primary | ICD-10-CM

## 2022-03-29 DIAGNOSIS — R73.03 PRE-DIABETES: ICD-10-CM

## 2022-03-29 DIAGNOSIS — M21.619 BUNION: ICD-10-CM

## 2022-03-29 PROCEDURE — 99214 OFFICE O/P EST MOD 30 MIN: CPT | Performed by: FAMILY MEDICINE

## 2022-05-18 DIAGNOSIS — I10 ESSENTIAL HYPERTENSION: ICD-10-CM

## 2022-05-19 RX ORDER — LOSARTAN POTASSIUM AND HYDROCHLOROTHIAZIDE 12.5; 5 MG/1; MG/1
TABLET ORAL
Qty: 90 TABLET | Refills: 3 | Status: SHIPPED | OUTPATIENT
Start: 2022-05-19 | End: 2022-09-29 | Stop reason: DRUGHIGH

## 2022-05-19 RX ORDER — LOSARTAN POTASSIUM 50 MG/1
TABLET ORAL
Qty: 90 TABLET | Refills: 3 | Status: SHIPPED | OUTPATIENT
Start: 2022-05-19 | End: 2022-09-29 | Stop reason: SDUPTHER

## 2022-05-19 NOTE — TELEPHONE ENCOUNTER
Rx Refill Note  Requested Prescriptions     Pending Prescriptions Disp Refills   • losartan (COZAAR) 50 MG tablet [Pharmacy Med Name: LOSARTAN TAB 50MG] 90 tablet 3     Sig: TAKE 1 TABLET DAILY   • losartan-hydrochlorothiazide (HYZAAR) 50-12.5 MG per tablet [Pharmacy Med Name: LOSARTAN/HCT TAB 50-12.5] 90 tablet 3     Sig: TAKE 1 TABLET DAILY      Last office visit with prescribing clinician: 3/29/2022      Next office visit with prescribing clinician: 9/29/2022    Last refill:    Losartan 3/15/2021  Losartan-HCTZ 3/15/2021    Ana Paredes MA  05/19/22, 08:37 CDT

## 2022-05-25 ENCOUNTER — PATIENT MESSAGE (OUTPATIENT)
Dept: FAMILY MEDICINE CLINIC | Facility: CLINIC | Age: 72
End: 2022-05-25

## 2022-05-26 NOTE — TELEPHONE ENCOUNTER
From: Milady BLOCK  To: Saman Ovalles  Sent: 5/25/2022 10:50 AM CDT  Subject: Overdue Labs    This is a reminder that you have labs due. Lab is here in our office M-F 7:30-4:30 (closed for lunch between 12-1). No appointment needed. Fasting labs. Nothing to eat or drink for 8 hours prior except black coffee and water.

## 2022-06-14 DIAGNOSIS — E29.1 HYPOGONADISM IN MALE: ICD-10-CM

## 2022-06-14 RX ORDER — TESTOSTERONE 10 MG/.5G
GEL, METERED TOPICAL
Qty: 240 G | Refills: 0 | Status: SHIPPED | OUTPATIENT
Start: 2022-06-14 | End: 2022-09-09

## 2022-06-14 NOTE — TELEPHONE ENCOUNTER
Rx Refill Note  Requested Prescriptions     Pending Prescriptions Disp Refills   • Testosterone 10 MG/ACT (2%) gel [Pharmacy Med Name: TESTOST(F) GEL PUM 2%] 240 g 0     Sig: APPLY THE CONTENTS OF 6    PUMP ACTUATIONS ON THE SKINAS DIRECTED BY PROVIDER    DAILY      Last office visit with prescribing clinician: 3/29/2022      Next office visit with prescribing clinician: 9/29/2022    Last refill: 1/27/2021    Ana Paredes MA  06/14/22, 08:14 CDT

## 2022-07-06 NOTE — PROGRESS NOTES
[facility] - PODIATRY    Today's Date: 07/11/2022     Patient Name: Saman Ovalles  MRN: [id number]  CSN: [id number]  PCP: Dawna Daniels MD  Referring Provider: Dawna Daniels MD    SUBJECTIVE     Chief Complaint   Patient presents with   • Establish Care     Dawna Daniels MD 03/29/2022 BUNION - PT STATES left and right bunion, does not cause any kind of pain unless wearing tighter shoes - pt denies pain - pt presents possible left and right bunion      HPI: Saman Ovalles, a 71 y.o.male, comes to clinic as a(n) new patient complaining of bunion deformity of both feet. Patient has h/o depression, HLD, HTN, hypogonadism. Patient presents for evaluation of bunion deformities of both feet with right worse than left. States that he has had bunions for approximately 20 years. Denies any issues related to pain unless wearing tight fitting shoes or material that dont give. States that he has heard about Lapiplasty procedure and was interested in more information. Denies pain. Denies previous treatment. Denies any constitutional symptoms. No other pedal complaints at this time.    Past Medical History:   Diagnosis Date   • Depression    • Hyperlipidemia    • Hypertension    • Hypogonadism male      History reviewed. No pertinent surgical history.  Family History   Problem Relation Age of Onset   • Heart disease Father    • Cancer Sister      Social History     Socioeconomic History   • Marital status: Single   Tobacco Use   • Smoking status: Never Smoker   • Smokeless tobacco: Never Used   • Tobacco comment: never smoked   Vaping Use   • Vaping Use: Never used   Substance and Sexual Activity   • Alcohol use: Yes     Comment: occ   • Drug use: Never     No Known Allergies  Current Outpatient Medications   Medication Sig Dispense Refill   • aspirin 81 MG chewable tablet Chew 81 mg Daily.     • atenolol (TENORMIN) 50 MG tablet Take 1 tablet by mouth Daily. 90 tablet 3   • atorvastatin  (Lipitor) 40 MG tablet Take 1 tablet by mouth Every Night. 90 tablet 3   • Cholecalciferol (VITAMIN D3) 125 MCG (5000 UT) capsule capsule Take 5,000 Units by mouth Daily.     • fenofibrate 160 MG tablet Take 1 tablet by mouth Daily. 90 tablet 3   • losartan (COZAAR) 50 MG tablet TAKE 1 TABLET DAILY 90 tablet 3   • losartan-hydrochlorothiazide (HYZAAR) 50-12.5 MG per tablet TAKE 1 TABLET DAILY 90 tablet 3   • Omega-3 Fatty Acids (FISH OIL) 1000 MG capsule capsule Take 2,000 mg by mouth 2 (Two) Times a Day With Meals.     • Testosterone 10 MG/ACT (2%) gel APPLY THE CONTENTS OF 6    PUMP ACTUATIONS ON THE SKINAS DIRECTED BY PROVIDER    DAILY 240 g 0   • venlafaxine XR (EFFEXOR-XR) 75 MG 24 hr capsule Take 1 capsule by mouth Daily. 90 capsule 3   • vitamin C (ASCORBIC ACID) 500 MG tablet Take 500 mg by mouth Daily.       No current facility-administered medications for this visit.     Review of Systems   Constitutional: Negative for chills and fever.   HENT: Negative for congestion.    Respiratory: Negative for shortness of breath.    Cardiovascular: Negative for chest pain and leg swelling.   Gastrointestinal: Negative for constipation, diarrhea, nausea and vomiting.   Musculoskeletal: Negative for arthralgias and myalgias.   Skin: Negative for wound.   Neurological: Negative for numbness.       OBJECTIVE     Vitals:    07/11/22 1503   BP: 150/90       PHYSICAL EXAM  GEN:   Accompanied by none.     Foot/Ankle Exam:       General:   Appearance: appears stated age and healthy    Orientation: AAOx3    Affect: appropriate    Gait: unimpaired    Assistance: independent    Shoe Gear:  Casual shoes    VASCULAR      Right Foot Vascularity   Dorsalis pedis:  2+  Posterior tibial:  2+  Skin Temperature: warm    Edema Grading:  None  CFT:  3  Pedal Hair Growth:  Present  Varicosities: none       Left Foot Vascularity   Dorsalis pedis:  2+  Posterior tibial:  2+  Skin Temperature: warm    Edema Grading:  None  CFT:  3  Pedal Hair  Growth:  Present  Varicosities: none        NEUROLOGIC     Right Foot Neurologic   Normal sensation    Light touch sensation:  Normal  Vibratory sensation:  Normal  Hot/Cold sensation: normal    Protective Sensation using Salol-Lisandra Monofilament:  10     Left Foot Neurologic   Normal sensation    Light touch sensation:  Normal  Vibratory sensation:  Normal  Hot/cold sensation: normal    Protective Sensation using Salol-Lisandra Monofilament:  10     MUSCULOSKELETAL      Right Foot Musculoskeletal   Ecchymosis:  None  Tenderness: none    Arch:  Normal  Hallux valgus: Yes (moderate medial eminence, mildly trackbound. No crepitus)    Hallux limitus: No    Tailor's bunion: Yes       Left Foot Musculoskeletal   Ecchymosis:  None  Tenderness: none    Arch:  Normal  Hallux valgus: Yes (moderate medial eminence, mildly trackbound. No crepitus)    Hallux limitus: No    Tailor's bunion: Yes       MUSCLE STRENGTH     Right Foot Muscle Strength   Foot dorsiflexion:  5  Foot plantar flexion:  5  Foot inversion:  5  Foot eversion:  5     Left Foot Muscle Strength   Foot dorsiflexion:  5  Foot plantar flexion:  5  Foot inversion:  5  Foot eversion:  5     RANGE OF MOTION      Right Foot Range of Motion   Foot and ankle ROM within normal limits       Left Foot Range of Motion   Foot and ankle ROM within normal limits       DERMATOLOGIC     Right Foot Dermatologic   Skin: dry skin    Nails: onychomycosis and abnormally thick       Left Foot Dermatologic   Skin: dry skin    Nails: onychomycosis and abnormally thick        RADIOLOGY/NUCLEAR:  No results found.    LABORATORY/CULTURE RESULTS:      PATHOLOGY RESULTS:       ASSESSMENT/PLAN     Diagnoses and all orders for this visit:    1. Valgus deformity of both great toes (Primary)    2. Tailor's bunion of both feet      Comprehensive lower extremity examination and evaluation was performed.  Discussed findings and treatment plan including risks, benefits, and treatment options  with patient in detail. Patient agreed with treatment plan.  Findings consistent with HAV and tailors bunion deformities bilaterally.    Discussed treatment options with patient including conservative measures with wider shoes, padding of bunions, toe spacers, etc. Also discussed possible surgical correction including Lapiplasty and/or arthrodesis procedures including post operative expectations.   Patient wishes to remain conservative at this point as he states that deformities are essentially non-painful and that he does not have anyone to help with post operative care.   An After Visit Summary was printed and given to the patient at discharge, including (if requested) any available informative/educational handouts regarding diagnosis, treatment, or medications. All questions were answered to patient/family satisfaction. Should symptoms fail to improve or worsen they agree to call or return to clinic or to go to the Emergency Department. Discussed the importance of following up with any needed screening tests/labs/specialist appointments and any requested follow-up recommended by me today. Importance of maintaining follow-up discussed and patient accepts that missed appointments can delay diagnosis and potentially lead to worsening of conditions.  Return if symptoms worsen or fail to improve., or sooner if acute issues arise.        This document has been electronically signed by JEFFREY Mclain on July 11, 2022 15:37 CDT

## 2022-07-08 ENCOUNTER — TELEPHONE (OUTPATIENT)
Dept: PODIATRY | Facility: CLINIC | Age: 72
End: 2022-07-08

## 2022-07-11 ENCOUNTER — OFFICE VISIT (OUTPATIENT)
Dept: PODIATRY | Facility: CLINIC | Age: 72
End: 2022-07-11

## 2022-07-11 VITALS
DIASTOLIC BLOOD PRESSURE: 90 MMHG | WEIGHT: 180 LBS | HEIGHT: 69 IN | BODY MASS INDEX: 26.66 KG/M2 | SYSTOLIC BLOOD PRESSURE: 150 MMHG

## 2022-07-11 DIAGNOSIS — M21.621 TAILOR'S BUNION OF BOTH FEET: ICD-10-CM

## 2022-07-11 DIAGNOSIS — M21.622 TAILOR'S BUNION OF BOTH FEET: ICD-10-CM

## 2022-07-11 DIAGNOSIS — M20.12 VALGUS DEFORMITY OF BOTH GREAT TOES: Primary | ICD-10-CM

## 2022-07-11 DIAGNOSIS — M20.11 VALGUS DEFORMITY OF BOTH GREAT TOES: Primary | ICD-10-CM

## 2022-07-11 PROCEDURE — 99213 OFFICE O/P EST LOW 20 MIN: CPT | Performed by: NURSE PRACTITIONER

## 2022-09-08 DIAGNOSIS — E29.1 HYPOGONADISM IN MALE: ICD-10-CM

## 2022-09-08 NOTE — TELEPHONE ENCOUNTER
Rx Refill Note  Requested Prescriptions     Pending Prescriptions Disp Refills   • Testosterone 10 MG/ACT (2%) gel [Pharmacy Med Name: TESTOST(F) GEL PUM 2%] 240 g 0     Sig: APPLY THE CONTENTS OF 6    PUMP ACTUATIONS ON THE SKINAS DIRECTED BY PROVIDER    DAILY      Last office visit with prescribing clinician: 03/29/2022     Next office visit with prescribing clinician: 9/29/2022    Last refill: 6/14/2022       Ana Paredes MA  09/08/22, 08:37 CDT

## 2022-09-09 RX ORDER — TESTOSTERONE 10 MG/.5G
GEL, METERED TOPICAL
Qty: 240 G | Refills: 0 | Status: SHIPPED | OUTPATIENT
Start: 2022-09-09 | End: 2022-12-13

## 2022-09-29 ENCOUNTER — OFFICE VISIT (OUTPATIENT)
Dept: FAMILY MEDICINE CLINIC | Facility: CLINIC | Age: 72
End: 2022-09-29

## 2022-09-29 VITALS
HEIGHT: 69 IN | OXYGEN SATURATION: 98 % | RESPIRATION RATE: 16 BRPM | BODY MASS INDEX: 25.92 KG/M2 | TEMPERATURE: 98 F | DIASTOLIC BLOOD PRESSURE: 76 MMHG | WEIGHT: 175 LBS | SYSTOLIC BLOOD PRESSURE: 150 MMHG | HEART RATE: 70 BPM

## 2022-09-29 VITALS
HEIGHT: 69 IN | SYSTOLIC BLOOD PRESSURE: 150 MMHG | TEMPERATURE: 98 F | HEART RATE: 70 BPM | OXYGEN SATURATION: 98 % | WEIGHT: 175.6 LBS | BODY MASS INDEX: 26.01 KG/M2 | RESPIRATION RATE: 16 BRPM | DIASTOLIC BLOOD PRESSURE: 76 MMHG

## 2022-09-29 DIAGNOSIS — Z00.00 WELL ADULT EXAM: Primary | ICD-10-CM

## 2022-09-29 DIAGNOSIS — E29.1 HYPOGONADISM IN MALE: ICD-10-CM

## 2022-09-29 DIAGNOSIS — I10 ESSENTIAL HYPERTENSION: Primary | ICD-10-CM

## 2022-09-29 DIAGNOSIS — E11.65 TYPE 2 DIABETES MELLITUS WITH HYPERGLYCEMIA, WITHOUT LONG-TERM CURRENT USE OF INSULIN: ICD-10-CM

## 2022-09-29 DIAGNOSIS — R94.4 DECREASED GFR: ICD-10-CM

## 2022-09-29 DIAGNOSIS — E78.1 HYPERTRIGLYCERIDEMIA: ICD-10-CM

## 2022-09-29 DIAGNOSIS — Z23 NEED FOR VACCINATION: ICD-10-CM

## 2022-09-29 PROCEDURE — 90715 TDAP VACCINE 7 YRS/> IM: CPT | Performed by: FAMILY MEDICINE

## 2022-09-29 PROCEDURE — 99397 PER PM REEVAL EST PAT 65+ YR: CPT | Performed by: FAMILY MEDICINE

## 2022-09-29 PROCEDURE — 99214 OFFICE O/P EST MOD 30 MIN: CPT | Performed by: FAMILY MEDICINE

## 2022-09-29 PROCEDURE — 90471 IMMUNIZATION ADMIN: CPT | Performed by: FAMILY MEDICINE

## 2022-09-29 RX ORDER — HYDROCHLOROTHIAZIDE 25 MG/1
25 TABLET ORAL DAILY
Qty: 90 TABLET | Refills: 3 | Status: SHIPPED | OUTPATIENT
Start: 2022-09-29

## 2022-09-29 RX ORDER — LOSARTAN POTASSIUM 50 MG/1
50 TABLET ORAL 2 TIMES DAILY
Qty: 180 TABLET | Refills: 3 | Status: SHIPPED | OUTPATIENT
Start: 2022-09-29

## 2022-10-13 NOTE — PROGRESS NOTES
"Subjective cc: adult well exam   Saman Ovalles is a 72 y.o. male who presents for adult well exam.    Weight is in normal range   Reviewed labs   Reviewed immunizations   Discussed colon cancer screening      History of Present Illness     The following portions of the patient's history were reviewed and updated as appropriate: allergies, current medications, past family history, past medical history, past social history, past surgical history and problem list.        Review of Systems    Objective   Blood pressure 150/76, pulse 70, temperature 98 °F (36.7 °C), temperature source Infrared, resp. rate 16, height 175.3 cm (69\"), weight 79.4 kg (175 lb), SpO2 98 %.  Physical Exam  Vitals and nursing note reviewed.   Constitutional:       General: He is not in acute distress.     Appearance: He is well-developed. He is not diaphoretic.   HENT:      Head: Normocephalic and atraumatic.      Right Ear: External ear normal.      Left Ear: External ear normal.      Nose: Nose normal.   Eyes:      General:         Right eye: No discharge.         Left eye: No discharge.      Conjunctiva/sclera: Conjunctivae normal.   Neck:      Thyroid: No thyromegaly.      Trachea: No tracheal deviation.   Cardiovascular:      Rate and Rhythm: Normal rate and regular rhythm.      Heart sounds: Normal heart sounds.   Pulmonary:      Effort: Pulmonary effort is normal. No respiratory distress.      Breath sounds: Normal breath sounds. No stridor. No wheezing.   Chest:      Chest wall: No tenderness.   Abdominal:      General: There is no distension.      Palpations: Abdomen is soft.      Tenderness: There is no abdominal tenderness.   Musculoskeletal:         General: Normal range of motion.      Cervical back: Normal range of motion.   Lymphadenopathy:      Cervical: No cervical adenopathy.   Skin:     General: Skin is warm and dry.   Neurological:      Mental Status: He is alert and oriented to person, place, and time.      Motor: No " abnormal muscle tone.      Coordination: Coordination normal.   Psychiatric:         Behavior: Behavior normal.         Thought Content: Thought content normal.         Judgment: Judgment normal.         Assessment & Plan   Problems Addressed this Visit    None  Visit Diagnoses     Well adult exam    -  Primary      Diagnoses       Codes Comments    Well adult exam    -  Primary ICD-10-CM: Z00.00  ICD-9-CM: V70.0         PLAN:     #1 adult well exam: reviewed labs, vitals discussed - HTN above goal, weight in normal range, reviewed colonoscopy, discussed immunizations, advised on diet and lifestyle changes           This document has been electronically signed by Dawna Daniels MD on October 13, 2022 16:42 CDT

## 2022-10-21 DIAGNOSIS — I10 ESSENTIAL HYPERTENSION: ICD-10-CM

## 2022-10-21 RX ORDER — ATENOLOL 50 MG/1
TABLET ORAL
Qty: 90 TABLET | Refills: 3 | Status: SHIPPED | OUTPATIENT
Start: 2022-10-21

## 2022-10-21 NOTE — TELEPHONE ENCOUNTER
Rx Refill Note  Requested Prescriptions     Pending Prescriptions Disp Refills   • atenolol (TENORMIN) 50 MG tablet [Pharmacy Med Name: ATENOLOL TAB 50MG] 90 tablet 3     Sig: TAKE 1 TABLET DAILY      Last office visit with prescribing clinician: 9/29/2022      Next office visit with prescribing clinician: 12/29/2022            Lety Jacobson MA  10/21/22, 13:45 CDT

## 2022-11-05 DIAGNOSIS — E78.1 HYPERTRIGLYCERIDEMIA: ICD-10-CM

## 2022-11-07 RX ORDER — FENOFIBRATE 160 MG/1
TABLET ORAL
Qty: 90 TABLET | Refills: 3 | Status: SHIPPED | OUTPATIENT
Start: 2022-11-07

## 2022-11-07 NOTE — TELEPHONE ENCOUNTER
Rx Refill Note  Requested Prescriptions     Pending Prescriptions Disp Refills   • fenofibrate 160 MG tablet [Pharmacy Med Name: FENOFIBRATE  TAB 160MG] 90 tablet 3     Sig: TAKE 1 TABLET DAILY      Last office visit with prescribing clinician: 9/29/2022      Next office visit with prescribing clinician: 12/29/2022            Lety Jacobson MA  11/07/22, 12:03 CST

## 2022-11-15 DIAGNOSIS — F41.9 ANXIETY AND DEPRESSION: ICD-10-CM

## 2022-11-15 DIAGNOSIS — F32.A ANXIETY AND DEPRESSION: ICD-10-CM

## 2022-11-15 DIAGNOSIS — E78.1 HYPERTRIGLYCERIDEMIA: ICD-10-CM

## 2022-11-16 RX ORDER — ATORVASTATIN CALCIUM 40 MG/1
TABLET, FILM COATED ORAL
Qty: 90 TABLET | Refills: 3 | Status: SHIPPED | OUTPATIENT
Start: 2022-11-16

## 2022-11-16 RX ORDER — VENLAFAXINE HYDROCHLORIDE 75 MG/1
CAPSULE, EXTENDED RELEASE ORAL
Qty: 90 CAPSULE | Refills: 3 | Status: SHIPPED | OUTPATIENT
Start: 2022-11-16

## 2022-11-16 NOTE — TELEPHONE ENCOUNTER
Rx Refill Note  Requested Prescriptions     Pending Prescriptions Disp Refills   • venlafaxine XR (EFFEXOR-XR) 75 MG 24 hr capsule [Pharmacy Med Name: VENLAFAXINE  CAP 75MG ER] 90 capsule 3     Sig: TAKE 1 CAPSULE DAILY   • atorvastatin (LIPITOR) 40 MG tablet [Pharmacy Med Name: ATORVASTATIN TAB 40MG] 90 tablet 3     Sig: TAKE 1 TABLET EVERY NIGHT      Last office visit with prescribing clinician: 9/29/2022      Next office visit with prescribing clinician: 12/29/2022            Flavia Downing LPN  11/16/22, 10:43 CST

## 2022-12-11 DIAGNOSIS — E29.1 HYPOGONADISM IN MALE: ICD-10-CM

## 2022-12-13 RX ORDER — TESTOSTERONE 10 MG/.5G
GEL, METERED TOPICAL
Qty: 240 G | Refills: 0 | Status: SHIPPED | OUTPATIENT
Start: 2022-12-13 | End: 2023-03-24

## 2022-12-13 NOTE — TELEPHONE ENCOUNTER
Rx Refill Note  Requested Prescriptions     Pending Prescriptions Disp Refills   • Testosterone 10 MG/ACT (2%) gel [Pharmacy Med Name: TESTOST(F) GEL PUM 2%] 240 g 0     Sig: APPLY THE CONTENTS OF 6    PUMP ACTUATIONS ON THE SKINAS DIRECTED BY PROVIDER    DAILY      Last office visit with prescribing clinician: 9/29/2022   Last telemedicine visit with prescribing clinician: 12/29/2022   Next office visit with prescribing clinician: 12/29/2022     Last Control contract: 9/28/2021    Ana Paredes MA  12/13/22, 07:40 CST

## 2022-12-29 ENCOUNTER — OFFICE VISIT (OUTPATIENT)
Dept: FAMILY MEDICINE CLINIC | Facility: CLINIC | Age: 72
End: 2022-12-29

## 2022-12-29 VITALS
OXYGEN SATURATION: 99 % | RESPIRATION RATE: 16 BRPM | HEART RATE: 90 BPM | DIASTOLIC BLOOD PRESSURE: 75 MMHG | SYSTOLIC BLOOD PRESSURE: 145 MMHG | BODY MASS INDEX: 25.92 KG/M2 | WEIGHT: 175 LBS | HEIGHT: 69 IN | TEMPERATURE: 98.5 F

## 2022-12-29 DIAGNOSIS — F32.A ANXIETY AND DEPRESSION: ICD-10-CM

## 2022-12-29 DIAGNOSIS — F41.9 ANXIETY AND DEPRESSION: ICD-10-CM

## 2022-12-29 DIAGNOSIS — I10 ESSENTIAL HYPERTENSION: Primary | ICD-10-CM

## 2022-12-29 DIAGNOSIS — E11.65 TYPE 2 DIABETES MELLITUS WITH HYPERGLYCEMIA, WITHOUT LONG-TERM CURRENT USE OF INSULIN: ICD-10-CM

## 2022-12-29 DIAGNOSIS — E29.1 HYPOGONADISM IN MALE: ICD-10-CM

## 2022-12-29 PROCEDURE — 99214 OFFICE O/P EST MOD 30 MIN: CPT | Performed by: FAMILY MEDICINE

## 2022-12-29 RX ORDER — AMLODIPINE BESYLATE 2.5 MG/1
2.5 TABLET ORAL DAILY
Qty: 90 TABLET | Refills: 0 | Status: SHIPPED | OUTPATIENT
Start: 2022-12-29 | End: 2023-03-21

## 2022-12-29 NOTE — PROGRESS NOTES
"Subjective cc: HTN   Saman Ovalles is a 72 y.o. male.     History of Present Illness     The patient presents today for a follow-up visit. He denies any problems or concerns today.    The patient's blood pressure today is 145/75 mmHg and his heart rate is 90 bpm. He states that the losartan 50 mg twice a day and hydrochlorothiazide 25 mg once a day did not make any difference in his blood pressure. The patient reports that his blood pressure stays between 130 and 150 systolic. He states that he does not see much over 150 systolic. The patient reports that his blood pressure is closer to 50 systolic. He states that it is getting hard to travel. The patient reports that he has never been on amlodipine.    The patient reports that he is still taking his cholesterol medication, fenofibrate, Effexor, and testosterone gel. He states that he is losing all elasticity in his skin.    The following portions of the patient's history were reviewed and updated as appropriate: allergies, current medications, past family history, past medical history, past social history, past surgical history and problem list.        Review of Systems    Objective   Blood pressure 145/75, pulse 90, temperature 98.5 °F (36.9 °C), temperature source Infrared, resp. rate 16, height 175.3 cm (69\"), weight 79.4 kg (175 lb), SpO2 99 %.  Physical Exam  Vitals and nursing note reviewed.   Constitutional:       General: He is not in acute distress.     Appearance: He is well-developed. He is not diaphoretic.   HENT:      Head: Normocephalic and atraumatic.      Right Ear: External ear normal.      Left Ear: External ear normal.      Nose: Nose normal.   Eyes:      General:         Right eye: No discharge.         Left eye: No discharge.      Conjunctiva/sclera: Conjunctivae normal.   Neck:      Thyroid: No thyromegaly.      Trachea: No tracheal deviation.   Cardiovascular:      Rate and Rhythm: Normal rate and regular rhythm.      Heart sounds: Normal " heart sounds.   Pulmonary:      Effort: Pulmonary effort is normal. No respiratory distress.      Breath sounds: Normal breath sounds. No stridor. No wheezing.   Chest:      Chest wall: No tenderness.   Abdominal:      General: There is no distension.      Palpations: Abdomen is soft.      Tenderness: There is no abdominal tenderness.   Musculoskeletal:         General: Normal range of motion.      Cervical back: Normal range of motion.   Lymphadenopathy:      Cervical: No cervical adenopathy.   Skin:     General: Skin is warm and dry.   Neurological:      Mental Status: He is alert and oriented to person, place, and time.      Motor: No abnormal muscle tone.      Coordination: Coordination normal.   Psychiatric:         Behavior: Behavior normal.         Thought Content: Thought content normal.         Judgment: Judgment normal.       Labs from 10/2022:  Kidney function is steady.  Liver function is good.  Electrolytes are good.  PSA is good.  A1c is 7.4.    Assessment & Plan   Problems Addressed this Visit        Cardiac and Vasculature    Essential hypertension - Primary    Relevant Medications    amLODIPine (NORVASC) 2.5 MG tablet       Endocrine and Metabolic    Hypogonadism in male       Mental Health    Anxiety and depression   Other Visit Diagnoses     Type 2 diabetes mellitus with hyperglycemia, without long-term current use of insulin (HCC)        Relevant Orders    Comprehensive metabolic panel    Hemoglobin A1c    CBC No Differential      Diagnoses       Codes Comments    Essential hypertension    -  Primary ICD-10-CM: I10  ICD-9-CM: 401.9     Anxiety and depression     ICD-10-CM: F41.9, F32.A  ICD-9-CM: 300.00, 311     Hypogonadism in male     ICD-10-CM: E29.1  ICD-9-CM: 257.2     Type 2 diabetes mellitus with hyperglycemia, without long-term current use of insulin (HCC)     ICD-10-CM: E11.65  ICD-9-CM: 250.00, 790.29         1. Hypertension: Chronic, uncontrolled.   -We will continue on current  medications. We will add amlodipine.   -We will gradually ease into amlodipine and back off of hydrochlorothiazide.   -Advised on blood pressure goals.    2. Anxiety and depression: Chronic, stable.   -Continue on current medication.    3. Hypogonadism: Chronic, stable.   -Continue on testosterone gel.    4. Diabetes mellitus: Chronic, uncontrolled with hyperglycemia.   -Reviewed patient's most recent hemoglobin A1c at 7.4. Advised on dietary changes.   -We will try decreasing the amount of fruit and rice that is in his current diet and see if we can lower his numbers.   -We will repeat labs for reevaluation. We will avoid medication at this time; however, if it remains uncontrolled, may need to add to his list.    Transcribed from ambient dictation for Dawna Daniels MD by Clara Choi, Quality .  12/29/22   12:43 CST    Patient or patient representative verbalized consent to the visit recording.  I have personally performed the services described in this document as transcribed by the above individual, and it is both accurate and complete.          This document has been electronically signed by Dawna Daniels MD on December 31, 2022 14:24 CST

## 2022-12-29 NOTE — PATIENT INSTRUCTIONS
"Hypertension, Adult  High blood pressure (hypertension) is when the force of blood pumping through the arteries is too strong. The arteries are the blood vessels that carry blood from the heart throughout the body. Hypertension forces the heart to work harder to pump blood and may cause arteries to become narrow or stiff. Untreated or uncontrolled hypertension can cause a heart attack, heart failure, a stroke, kidney disease, and other problems.  A blood pressure reading consists of a higher number over a lower number. Ideally, your blood pressure should be below 120/80. The first (\"top\") number is called the systolic pressure. It is a measure of the pressure in your arteries as your heart beats. The second (\"bottom\") number is called the diastolic pressure. It is a measure of the pressure in your arteries as the heart relaxes.  What are the causes?  The exact cause of this condition is not known. There are some conditions that result in or are related to high blood pressure.  What increases the risk?  Some risk factors for high blood pressure are under your control. The following factors may make you more likely to develop this condition:  Smoking.  Having type 2 diabetes mellitus, high cholesterol, or both.  Not getting enough exercise or physical activity.  Being overweight.  Having too much fat, sugar, calories, or salt (sodium) in your diet.  Drinking too much alcohol.  Some risk factors for high blood pressure may be difficult or impossible to change. Some of these factors include:  Having chronic kidney disease.  Having a family history of high blood pressure.  Age. Risk increases with age.  Race. You may be at higher risk if you are .  Gender. Men are at higher risk than women before age 45. After age 65, women are at higher risk than men.  Having obstructive sleep apnea.  Stress.  What are the signs or symptoms?  High blood pressure may not cause symptoms. Very high blood pressure " (hypertensive crisis) may cause:  Headache.  Anxiety.  Shortness of breath.  Nosebleed.  Nausea and vomiting.  Vision changes.  Severe chest pain.  Seizures.  How is this diagnosed?  This condition is diagnosed by measuring your blood pressure while you are seated, with your arm resting on a flat surface, your legs uncrossed, and your feet flat on the floor. The cuff of the blood pressure monitor will be placed directly against the skin of your upper arm at the level of your heart. It should be measured at least twice using the same arm. Certain conditions can cause a difference in blood pressure between your right and left arms.  Certain factors can cause blood pressure readings to be lower or higher than normal for a short period of time:  When your blood pressure is higher when you are in a health care provider's office than when you are at home, this is called white coat hypertension. Most people with this condition do not need medicines.  When your blood pressure is higher at home than when you are in a health care provider's office, this is called masked hypertension. Most people with this condition may need medicines to control blood pressure.  If you have a high blood pressure reading during one visit or you have normal blood pressure with other risk factors, you may be asked to:  Return on a different day to have your blood pressure checked again.  Monitor your blood pressure at home for 1 week or longer.  If you are diagnosed with hypertension, you may have other blood or imaging tests to help your health care provider understand your overall risk for other conditions.  How is this treated?  This condition is treated by making healthy lifestyle changes, such as eating healthy foods, exercising more, and reducing your alcohol intake. Your health care provider may prescribe medicine if lifestyle changes are not enough to get your blood pressure under control, and if:  Your systolic blood pressure is above  130.  Your diastolic blood pressure is above 80.  Your personal target blood pressure may vary depending on your medical conditions, your age, and other factors.  Follow these instructions at home:  Eating and drinking    Eat a diet that is high in fiber and potassium, and low in sodium, added sugar, and fat. An example eating plan is called the DASH (Dietary Approaches to Stop Hypertension) diet. To eat this way:  Eat plenty of fresh fruits and vegetables. Try to fill one half of your plate at each meal with fruits and vegetables.  Eat whole grains, such as whole-wheat pasta, brown rice, or whole-grain bread. Fill about one fourth of your plate with whole grains.  Eat or drink low-fat dairy products, such as skim milk or low-fat yogurt.  Avoid fatty cuts of meat, processed or cured meats, and poultry with skin. Fill about one fourth of your plate with lean proteins, such as fish, chicken without skin, beans, eggs, or tofu.  Avoid pre-made and processed foods. These tend to be higher in sodium, added sugar, and fat.  Reduce your daily sodium intake. Most people with hypertension should eat less than 1,500 mg of sodium a day.  Do not drink alcohol if:  Your health care provider tells you not to drink.  You are pregnant, may be pregnant, or are planning to become pregnant.  If you drink alcohol:  Limit how much you use to:  0-1 drink a day for women.  0-2 drinks a day for men.  Be aware of how much alcohol is in your drink. In the [place] one drink equals one 12 oz bottle of beer (355 mL), one 5 oz glass of wine (148 mL), or one 1½ oz glass of hard liquor (44 mL).  Lifestyle    Work with your health care provider to maintain a healthy body weight or to lose weight. Ask what an ideal weight is for you.  Get at least 30 minutes of exercise most days of the week. Activities may include walking, swimming, or biking.  Include exercise to strengthen your muscles (resistance exercise), such as Pilates or lifting weights, as  part of your weekly exercise routine. Try to do these types of exercises for 30 minutes at least 3 days a week.  Do not use any products that contain nicotine or tobacco, such as cigarettes, e-cigarettes, and chewing tobacco. If you need help quitting, ask your health care provider.  Monitor your blood pressure at home as told by your health care provider.  Keep all follow-up visits as told by your health care provider. This is important.  Medicines  Take over-the-counter and prescription medicines only as told by your health care provider. Follow directions carefully. Blood pressure medicines must be taken as prescribed.  Do not skip doses of blood pressure medicine. Doing this puts you at risk for problems and can make the medicine less effective.  Ask your health care provider about side effects or reactions to medicines that you should watch for.  Contact a health care provider if you:  Think you are having a reaction to a medicine you are taking.  Have headaches that keep coming back (recurring).  Feel dizzy.  Have swelling in your ankles.  Have trouble with your vision.  Get help right away if you:  Develop a severe headache or confusion.  Have unusual weakness or numbness.  Feel faint.  Have severe pain in your chest or abdomen.  Vomit repeatedly.  Have trouble breathing.  Summary  Hypertension is when the force of blood pumping through your arteries is too strong. If this condition is not controlled, it may put you at risk for serious complications.  Your personal target blood pressure may vary depending on your medical conditions, your age, and other factors. For most people, a normal blood pressure is less than 120/80.  Hypertension is treated with lifestyle changes, medicines, or a combination of both. Lifestyle changes include losing weight, eating a healthy, low-sodium diet, exercising more, and limiting alcohol.  This information is not intended to replace advice given to you by your health care  "provider. Make sure you discuss any questions you have with your health care provider.  Document Revised: 08/28/2019 Document Reviewed: 08/28/2019  [employer] Patient Education © 2022 [employer]  DASH Eating Plan  DASH stands for Dietary Approaches to Stop Hypertension. The DASH eating plan is a healthy eating plan that has been shown to:  Reduce high blood pressure (hypertension).  Reduce your risk for type 2 diabetes, heart disease, and stroke.  Help with weight loss.  What are tips for following this plan?  Reading food labels  Check food labels for the amount of salt (sodium) per serving. Choose foods with less than 5 percent of the Daily Value of sodium. Generally, foods with less than 300 milligrams (mg) of sodium per serving fit into this eating plan.  To find whole grains, look for the word \"whole\" as the first word in the ingredient list.  Shopping  Buy products labeled as \"low-sodium\" or \"no salt added.\"  Buy fresh foods. Avoid canned foods and pre-made or frozen meals.  Cooking  Avoid adding salt when cooking. Use salt-free seasonings or herbs instead of table salt or sea salt. Check with your health care provider or pharmacist before using salt substitutes.  Do not henao foods. Cook foods using healthy methods such as baking, boiling, grilling, roasting, and broiling instead.  Cook with heart-healthy oils, such as olive, canola, avocado, soybean, or sunflower oil.  Meal planning    Eat a balanced diet that includes:  4 or more servings of fruits and 4 or more servings of vegetables each day. Try to fill one-half of your plate with fruits and vegetables.  6-8 servings of whole grains each day.  Less than 6 oz (170 g) of lean meat, poultry, or fish each day. A 3-oz (85-g) serving of meat is about the same size as a deck of cards. One egg equals 1 oz (28 g).  2-3 servings of low-fat dairy each day. One serving is 1 cup (237 mL).  1 serving of nuts, seeds, or beans 5 times each week.  2-3 servings of " heart-healthy fats. Healthy fats called omega-3 fatty acids are found in foods such as walnuts, flaxseeds, fortified milks, and eggs. These fats are also found in cold-water fish, such as sardines, salmon, and mackerel.  Limit how much you eat of:  Canned or prepackaged foods.  Food that is high in trans fat, such as some fried foods.  Food that is high in saturated fat, such as fatty meat.  Desserts and other sweets, sugary drinks, and other foods with added sugar.  Full-fat dairy products.  Do not salt foods before eating.  Do not eat more than 4 egg yolks a week.  Try to eat at least 2 vegetarian meals a week.  Eat more home-cooked food and less restaurant, buffet, and fast food.  Lifestyle  When eating at a restaurant, ask that your food be prepared with less salt or no salt, if possible.  If you drink alcohol:  Limit how much you use to:  0-1 drink a day for women who are not pregnant.  0-2 drinks a day for men.  Be aware of how much alcohol is in your drink. In the [place] one drink equals one 12 oz bottle of beer (355 mL), one 5 oz glass of wine (148 mL), or one 1½ oz glass of hard liquor (44 mL).  General information  Avoid eating more than 2,300 mg of salt a day. If you have hypertension, you may need to reduce your sodium intake to 1,500 mg a day.  Work with your health care provider to maintain a healthy body weight or to lose weight. Ask what an ideal weight is for you.  Get at least 30 minutes of exercise that causes your heart to beat faster (aerobic exercise) most days of the week. Activities may include walking, swimming, or biking.  Work with your health care provider or dietitian to adjust your eating plan to your individual calorie needs.  What foods should I eat?  Fruits  All fresh, dried, or frozen fruit. Canned fruit in natural juice (without added sugar).  Vegetables  Fresh or frozen vegetables (raw, steamed, roasted, or grilled). Low-sodium or reduced-sodium tomato and vegetable juice.  Low-sodium or reduced-sodium tomato sauce and tomato paste. Low-sodium or reduced-sodium canned vegetables.  Grains  Whole-grain or whole-wheat bread. Whole-grain or whole-wheat pasta. Brown rice. Oatmeal. Quinoa. Bulgur. Whole-grain and low-sodium cereals. Olga Lidia bread. Low-fat, low-sodium crackers. Whole-wheat flour tortillas.  Meats and other proteins  Skinless chicken or turkey. Ground chicken or turkey. Pork with fat trimmed off. Fish and seafood. Egg whites. Dried beans, peas, or lentils. Unsalted nuts, nut butters, and seeds. Unsalted canned beans. Lean cuts of beef with fat trimmed off. Low-sodium, lean precooked or cured meat, such as sausages or meat loaves.  Dairy  Low-fat (1%) or fat-free (skim) milk. Reduced-fat, low-fat, or fat-free cheeses. Nonfat, low-sodium ricotta or cottage cheese. Low-fat or nonfat yogurt. Low-fat, low-sodium cheese.  Fats and oils  Soft margarine without trans fats. Vegetable oil. Reduced-fat, low-fat, or light mayonnaise and salad dressings (reduced-sodium). Canola, safflower, olive, avocado, soybean, and sunflower oils. Avocado.  Seasonings and condiments  Herbs. Spices. Seasoning mixes without salt.  Other foods  Unsalted popcorn and pretzels. Fat-free sweets.  The items listed above may not be a complete list of foods and beverages you can eat. Contact a dietitian for more information.  What foods should I avoid?  Fruits  Canned fruit in a light or heavy syrup. Fried fruit. Fruit in cream or butter sauce.  Vegetables  Creamed or fried vegetables. Vegetables in a cheese sauce. Regular canned vegetables (not low-sodium or reduced-sodium). Regular canned tomato sauce and paste (not low-sodium or reduced-sodium). Regular tomato and vegetable juice (not low-sodium or reduced-sodium). Pickles. Olives.  Grains  Baked goods made with fat, such as croissants, muffins, or some breads. Dry pasta or rice meal packs.  Meats and other proteins  Fatty cuts of meat. Ribs. Fried meat. Marie.  Bologna, salami, and other precooked or cured meats, such as sausages or meat loaves. Fat from the back of a pig (fatback). Bratwurst. Salted nuts and seeds. Canned beans with added salt. Canned or smoked fish. Whole eggs or egg yolks. Chicken or turkey with skin.  Dairy  Whole or 2% milk, cream, and half-and-half. Whole or full-fat cream cheese. Whole-fat or sweetened yogurt. Full-fat cheese. Nondairy creamers. Whipped toppings. Processed cheese and cheese spreads.  Fats and oils  Butter. Stick margarine. Lard. Shortening. Ghee. Marie fat. Tropical oils, such as coconut, palm kernel, or palm oil.  Seasonings and condiments  Onion salt, garlic salt, seasoned salt, table salt, and sea salt. Worcestershire sauce. Tartar sauce. Barbecue sauce. Teriyaki sauce. Soy sauce, including reduced-sodium. Steak sauce. Canned and packaged gravies. Fish sauce. Oyster sauce. Cocktail sauce. Store-bought horseradish. Ketchup. Mustard. Meat flavorings and tenderizers. Bouillon cubes. Hot sauces. Pre-made or packaged marinades. Pre-made or packaged taco seasonings. Relishes. Regular salad dressings.  Other foods  Salted popcorn and pretzels.  The items listed above may not be a complete list of foods and beverages you should avoid. Contact a dietitian for more information.  Where to find more information  National Heart, Lung, and [employer] [place]: www.nhlbi.nih.gov  American Heart Association: www.heart.org  Academy of Nutrition and Dietetics: www.eatright.org  National Kidney Foundation: www.kidney.org  Summary  The DASH eating plan is a healthy eating plan that has been shown to reduce high blood pressure (hypertension). It may also reduce your risk for type 2 diabetes, heart disease, and stroke.  When on the DASH eating plan, aim to eat more fresh fruits and vegetables, whole grains, lean proteins, low-fat dairy, and heart-healthy fats.  With the DASH eating plan, you should limit salt (sodium) intake to 2,300 mg a day. If you have  hypertension, you may need to reduce your sodium intake to 1,500 mg a day.  Work with your health care provider or dietitian to adjust your eating plan to your individual calorie needs.  This information is not intended to replace advice given to you by your health care provider. Make sure you discuss any questions you have with your health care provider.  Document Revised: 11/20/2020 Document Reviewed: 11/20/2020  Elsevier Patient Education © 2022 [employer]

## 2023-03-21 DIAGNOSIS — I10 ESSENTIAL HYPERTENSION: ICD-10-CM

## 2023-03-21 RX ORDER — AMLODIPINE BESYLATE 2.5 MG/1
TABLET ORAL
Qty: 90 TABLET | Refills: 0 | Status: SHIPPED | OUTPATIENT
Start: 2023-03-21 | End: 2023-04-04 | Stop reason: SDUPTHER

## 2023-03-21 NOTE — TELEPHONE ENCOUNTER
Rx Refill Note  Requested Prescriptions     Pending Prescriptions Disp Refills   • amLODIPine (NORVASC) 2.5 MG tablet [Pharmacy Med Name: AMLODIPINE TAB 2.5MG] 90 tablet 0     Sig: TAKE 1 TABLET DAILY      Last office visit with prescribing clinician: 12/29/2022   Last telemedicine visit with prescribing clinician: 4/4/2023   Next office visit with prescribing clinician: 4/4/2023                         Would you like a call back once the refill request has been completed: [] Yes [] No    If the office needs to give you a call back, can they leave a voicemail: [] Yes [] No    Flavia Downing LPN  03/21/23, 10:26 CDT

## 2023-03-23 DIAGNOSIS — E29.1 HYPOGONADISM IN MALE: ICD-10-CM

## 2023-03-23 NOTE — TELEPHONE ENCOUNTER
Rx Refill Note  Requested Prescriptions     Pending Prescriptions Disp Refills   • Testosterone 10 MG/ACT (2%) gel [Pharmacy Med Name: TESTOST(F) GEL PUM 2%] 240 g 0     Sig: APPLY THE CONTENTS OF 6    PUMP ACTUATIONS ON THE SKINAS DIRECTED BY PROVIDER    DAILY      Last office visit with prescribing clinician: 12/29/2022   Next office visit with prescribing clinician: 4/4/2023    Last refill: 2/13/2022       Ana Paredes MA  03/23/23, 10:30 CDT

## 2023-03-24 RX ORDER — TESTOSTERONE 10 MG/.5G
GEL, METERED TOPICAL
Qty: 240 G | Refills: 0 | Status: SHIPPED | OUTPATIENT
Start: 2023-03-24

## 2023-04-04 ENCOUNTER — OFFICE VISIT (OUTPATIENT)
Dept: FAMILY MEDICINE CLINIC | Facility: CLINIC | Age: 73
End: 2023-04-04
Payer: COMMERCIAL

## 2023-04-04 VITALS
BODY MASS INDEX: 25.39 KG/M2 | RESPIRATION RATE: 16 BRPM | DIASTOLIC BLOOD PRESSURE: 77 MMHG | HEIGHT: 69 IN | HEART RATE: 60 BPM | SYSTOLIC BLOOD PRESSURE: 128 MMHG | TEMPERATURE: 98.2 F | OXYGEN SATURATION: 98 % | WEIGHT: 171.4 LBS

## 2023-04-04 DIAGNOSIS — E78.2 MIXED HYPERLIPIDEMIA: ICD-10-CM

## 2023-04-04 DIAGNOSIS — Z79.899 MEDICATION MANAGEMENT: ICD-10-CM

## 2023-04-04 DIAGNOSIS — E29.1 HYPOGONADISM IN MALE: ICD-10-CM

## 2023-04-04 DIAGNOSIS — E11.65 TYPE 2 DIABETES MELLITUS WITH HYPERGLYCEMIA, WITHOUT LONG-TERM CURRENT USE OF INSULIN: ICD-10-CM

## 2023-04-04 DIAGNOSIS — I10 ESSENTIAL HYPERTENSION: Primary | ICD-10-CM

## 2023-04-04 LAB
EXPIRATION DATE: NORMAL
HBA1C MFR BLD: 7.5 %
Lab: NORMAL

## 2023-04-04 RX ORDER — AMLODIPINE BESYLATE 5 MG/1
5 TABLET ORAL DAILY
Qty: 90 TABLET | Refills: 1 | Status: SHIPPED | OUTPATIENT
Start: 2023-04-04

## 2023-04-04 RX ORDER — DULAGLUTIDE 0.75 MG/.5ML
0.75 INJECTION, SOLUTION SUBCUTANEOUS WEEKLY
Qty: 2 ML | Refills: 2 | Status: SHIPPED | OUTPATIENT
Start: 2023-04-04 | End: 2023-04-07

## 2023-04-04 NOTE — PROGRESS NOTES
"Subjective cc: INESSA Ovalles is a 72 y.o. male.     History of Present Illness   The patient is doing well. He has not seen any changes in his blood pressure. His blood pressure today is 120/70 mmHg. The patient has been monitoring his blood pressure at home, but admits that he has not checked it recently. His systolic blood pressure was in the 130s to 150s mmHg approximately 2 months after his last office visit. He is currently taking losartan 50 mg 2 times a day, hydrochlorothiazide 25 mg 1 time a day, amlodipine 2.5 mg 1 time a day, and atenolol 50 mg.    The patient has lost 4 pounds.    The patient's testosterone gel is working well for him.    The patient is taking Effexor. His mood has been well controlled.    The patient is taking Lipitor for his cholesterol.    The patient's 01/2023 labs showed an elevation in his A1c at 7.4 percent. He has never taken anything for diabetes in the past. He tries to follow a healthy diet. The patient has eliminated white potatoes from his diet. He eats more sweet potatoes now. He occasionally eats oat bread.      The following portions of the patient's history were reviewed and updated as appropriate: allergies, current medications, past family history, past medical history, past social history, past surgical history and problem list.        Review of Systems  A review of systems was performed, and pertinent findings are noted in the HPI.    Objective   Blood pressure 128/77, pulse 60, temperature 98.2 °F (36.8 °C), temperature source Infrared, resp. rate 16, height 175.3 cm (69\"), weight 77.7 kg (171 lb 6.4 oz), SpO2 98 %.  Physical Exam  Vitals and nursing note reviewed.   Constitutional:       Appearance: He is well-developed. He is not ill-appearing or diaphoretic.   HENT:      Head: Normocephalic and atraumatic.      Right Ear: External ear normal.      Left Ear: External ear normal.      Nose: Nose normal.   Eyes:      General: Lids are normal. No scleral " icterus.     Conjunctiva/sclera: Conjunctivae normal.   Neck:      Thyroid: No thyromegaly.   Cardiovascular:      Rate and Rhythm: Normal rate and regular rhythm.      Pulses: Normal pulses.      Heart sounds: No murmur heard.    No friction rub. No gallop.   Pulmonary:      Effort: Pulmonary effort is normal. No accessory muscle usage.      Breath sounds: Normal breath sounds. No rhonchi or rales.   Chest:      Chest wall: No tenderness.   Abdominal:      General: There is no distension.      Palpations: Abdomen is soft.      Tenderness: There is no abdominal tenderness.   Musculoskeletal:         General: No tenderness.      Cervical back: Full passive range of motion without pain, normal range of motion and neck supple. No edema. Normal range of motion.      Right lower leg: No edema.      Left lower leg: No edema.   Lymphadenopathy:      Cervical: No cervical adenopathy.   Skin:     General: Skin is warm and dry.   Neurological:      Mental Status: He is alert and oriented to person, place, and time.      Coordination: Coordination normal.   Psychiatric:         Mood and Affect: Mood normal.         Speech: Speech normal.         Behavior: Behavior normal. Behavior is cooperative.         Thought Content: Thought content normal.         Judgment: Judgment normal.           Results:  Labs from 01/2023:  Kidney function has improved.  A1c was 7.4 percent.    Assessment & Plan   Problems Addressed this Visit        Cardiac and Vasculature    Essential hypertension - Primary    Relevant Medications    amLODIPine (NORVASC) 5 MG tablet       Endocrine and Metabolic    Hypogonadism in male    Type 2 diabetes mellitus with hyperglycemia, without long-term current use of insulin    Relevant Medications    Dulaglutide (Trulicity) 0.75 MG/0.5ML solution pen-injector    Other Relevant Orders    POC Glycosylated Hemoglobin (Hb A1C) (Completed)   Other Visit Diagnoses     Medication management        Relevant Orders     ToxASSURE Select 13 (MW) - Urine, Clean Catch    Mixed hyperlipidemia          Diagnoses       Codes Comments    Essential hypertension    -  Primary ICD-10-CM: I10  ICD-9-CM: 401.9     Medication management     ICD-10-CM: Z79.899  ICD-9-CM: V58.69     Type 2 diabetes mellitus with hyperglycemia, without long-term current use of insulin     ICD-10-CM: E11.65  ICD-9-CM: 250.00, 790.29     Mixed hyperlipidemia     ICD-10-CM: E78.2  ICD-9-CM: 272.2     Hypogonadism in male     ICD-10-CM: E29.1  ICD-9-CM: 257.2         1. Hypertension: Chronic, uncontrolled.  - Will increase dose of amlodipine.  - Will monitor blood pressure and notify the office if it is uncontrolled.    2. Diabetes: Chronic, uncontrolled with hyperglycemia.  - His last A1c was 7.4.  - Will recheck A1c today for reevaluation.  - Discussed dietary changes; however, he already eats healthy; therefore, I do not know that it leaves us a lot of room for dietary changes.  - Will discuss medication if still above goal.    3. Low testosterone: Chronic, stable.  - Continue on testosterone gel.  - Reviewed lab testing and will continue on replacement.    4. Hyperlipidemia: Chronic, stable.  - Continue on Lipitor.  - Will monitor lipid panel.          Transcribed from ambient dictation for Dawna Daniels MD by Freda Lou.  04/04/23   11:54 CDT    Patient or patient representative verbalized consent to the visit recording.  I have personally performed the services described in this document as transcribed by the above individual, and it is both accurate and complete.        This document has been electronically signed by Dawna Daniels MD on April 4, 2023 15:44 CDT

## 2023-04-07 ENCOUNTER — TELEPHONE (OUTPATIENT)
Dept: FAMILY MEDICINE CLINIC | Facility: CLINIC | Age: 73
End: 2023-04-07

## 2023-04-07 NOTE — TELEPHONE ENCOUNTER
Ozempic sent to local pharmacy so we can contact them and find out cost, then if it is approved we send to mail order so that does not happen again.

## 2023-04-07 NOTE — TELEPHONE ENCOUNTER
Spoke with patient.  He reports he received Trulicity in the mail and it is too expensive.  He tried to call [employer] to return it and they told him to call [facility]. He is not sure what he can do.  Price for monthly supply $229.

## 2023-04-07 NOTE — TELEPHONE ENCOUNTER
Caller: Saman Ovalles    Relationship: Self    Best call back number: [phone]    What is the best time to reach you: ANY    Who are you requesting to speak with (clinical staff, provider,  specific staff member): CLINICAL    What was the call regarding:     PATIENT STATES THE INJECTOR PRESCRIPTION IS TOO EXPENSIVE. PATIENT WOULD LIKE TO DISCUSS ALTERNATIVE MEDICATIONS TO CONTROL HIS BLOOD SUGAR.     Do you require a callback: YES

## 2023-04-10 LAB — DRUGS UR: NORMAL

## 2023-04-11 NOTE — TELEPHONE ENCOUNTER
Spoke with [facility] pharmacy in [facility] Ozempic is $24.99/month.     Contacted [facility]- They unfortunately are not able to return any medications once they have been received by patient.  I advised patient to contact [facility] himself because he was not notified of pricing before he was sent medication.    Co-pay for Ozempic will be $150/month if sent to mail order.     Patient advised and will let us know how he wants to proceed.

## 2023-05-08 ENCOUNTER — TELEPHONE (OUTPATIENT)
Dept: FAMILY MEDICINE CLINIC | Facility: CLINIC | Age: 73
End: 2023-05-08
Payer: COMMERCIAL

## 2023-05-08 DIAGNOSIS — E11.65 TYPE 2 DIABETES MELLITUS WITH HYPERGLYCEMIA, WITHOUT LONG-TERM CURRENT USE OF INSULIN: Primary | ICD-10-CM

## 2023-05-08 NOTE — TELEPHONE ENCOUNTER
Caller: Saman Ovalles    Relationship: Self    Best call back number: [phone]    What is the best time to reach you: ANY    Who are you requesting to speak with (clinical staff, provider,  specific staff member): CLINICAL       What was the call regarding: [facility] SENT THE WRONG MEDICINE?  PATIENT STATES ITS TRULICITY AND NOT OZEMPIC.  HE WANTS AN ORAL MEDICATION.      Do you require a callback: YES

## 2023-05-10 RX ORDER — ORAL SEMAGLUTIDE 3 MG/1
3 TABLET ORAL DAILY
Qty: 30 TABLET | Refills: 0 | Status: SHIPPED | OUTPATIENT
Start: 2023-05-10

## 2023-05-10 NOTE — TELEPHONE ENCOUNTER
Called [facility] to see if pt ever picked up Ozempic- spoke with . Reports that med was filled on 4/23/2023 but pt never picked up so med was returned to stock.     Called pt to confirm that he is wanting an oral medication and not an injectable. Pt confirmed that he wants a pill, explained that injectables are too expensive. Pt would like pill form medication sent to mail order. Reports that he took last injection dose of the Trulicity (that he couldn;t return) this week.

## 2023-05-12 RX ORDER — LANCETS 28 GAUGE
1 EACH MISCELLANEOUS 3 TIMES DAILY PRN
Qty: 100 EACH | Refills: 12 | Status: SHIPPED | OUTPATIENT
Start: 2023-05-12

## 2023-05-12 RX ORDER — BLOOD-GLUCOSE METER
1 KIT MISCELLANEOUS DAILY
Qty: 1 EACH | Refills: 0 | Status: SHIPPED | OUTPATIENT
Start: 2023-05-12

## 2023-05-12 NOTE — TELEPHONE ENCOUNTER
Called pt back to notify- pt verbalized understanding. Pt reports while taking the trulicty he had some dizzy spells to the point where he almost passed out. Asked pt if he had checked his sugar levels. Pt reports that he doesn't have a way to check. Explained to pt that he can purchase a cost effect testing meter and strips at pharmacy to check. Pt verbalized understanding. Advised pt if symptoms worsen to follow up.

## 2023-06-08 NOTE — TELEPHONE ENCOUNTER
I would recommend to call [email or web address] and see if it can be returned if box is not opened. I will send alternative.    No

## 2023-07-11 ENCOUNTER — OFFICE VISIT (OUTPATIENT)
Dept: FAMILY MEDICINE CLINIC | Facility: CLINIC | Age: 73
End: 2023-07-11
Payer: COMMERCIAL

## 2023-07-11 VITALS
RESPIRATION RATE: 20 BRPM | TEMPERATURE: 98 F | SYSTOLIC BLOOD PRESSURE: 117 MMHG | HEIGHT: 69 IN | DIASTOLIC BLOOD PRESSURE: 66 MMHG | HEART RATE: 64 BPM | OXYGEN SATURATION: 98 % | WEIGHT: 172.2 LBS | BODY MASS INDEX: 25.51 KG/M2

## 2023-07-11 DIAGNOSIS — E78.2 MIXED HYPERLIPIDEMIA: ICD-10-CM

## 2023-07-11 DIAGNOSIS — E29.1 HYPOGONADISM IN MALE: ICD-10-CM

## 2023-07-11 DIAGNOSIS — E11.65 TYPE 2 DIABETES MELLITUS WITH HYPERGLYCEMIA, WITHOUT LONG-TERM CURRENT USE OF INSULIN: ICD-10-CM

## 2023-07-11 DIAGNOSIS — I10 ESSENTIAL HYPERTENSION: ICD-10-CM

## 2023-07-11 DIAGNOSIS — I49.9 IRREGULAR HEART RHYTHM: Primary | ICD-10-CM

## 2023-07-11 PROCEDURE — 99214 OFFICE O/P EST MOD 30 MIN: CPT | Performed by: FAMILY MEDICINE

## 2023-07-11 NOTE — PROGRESS NOTES
"Subjective   Saman Ovalles is a 72 y.o. male.     History of Present Illness   Saman Ovalles is a 72-year-old male who presents today for evaluation of irregular heart rhythm.    The patient reports that he has been experiencing an irregular heartbeat all of his life. He states that approximately 30 years ago, he was placed on atenolol, which regulated his hear beat. He notes that until now, things have changed,  gotten a lot worse.  He states that the irregular heart beat lasts for a few minutes and goes on all day. He notes that during the episodes, there are no other symptoms however, there are times his heart can beat so hard, it actually hurts. He denies any shortness of breath. He states that he gets dizzy. He states that it was happening daily a week ago. He states that it usually happens at the same time of the day. He cannot identify any triggers. He states that it is better when he is active. He states that he never had a problem when he is working out. He is taking aspirin. His blood pressure has been running really low. He reports the irregular heartbeat started after the Trulicity and Rybelsus; he quit taking Rybelsus after a week. He eats fairly healthy and drinks a lot of water.    He reports he has an irregular spot on his skin that looks different than his other age spots.    The following portions of the patient's history were reviewed and updated as appropriate: allergies, current medications, past family history, past medical history, past social history, past surgical history, and problem list.        Review of Systems    Objective   Blood pressure 117/66, pulse 64, temperature 98 °F (36.7 °C), temperature source Infrared, resp. rate 20, height 175.3 cm (69\"), weight 78.1 kg (172 lb 3.2 oz), SpO2 98 %.  Physical Exam  Vitals and nursing note reviewed.   Constitutional:       General: He is not in acute distress.     Appearance: He is well-developed. He is not diaphoretic.   HENT:      Head: " Normocephalic and atraumatic.      Right Ear: External ear normal.      Left Ear: External ear normal.      Nose: Nose normal.   Eyes:      General:         Right eye: No discharge.         Left eye: No discharge.      Conjunctiva/sclera: Conjunctivae normal.   Neck:      Thyroid: No thyromegaly.      Trachea: No tracheal deviation.   Cardiovascular:      Rate and Rhythm: Normal rate and regular rhythm.      Heart sounds: Normal heart sounds.   Pulmonary:      Effort: Pulmonary effort is normal. No respiratory distress.      Breath sounds: Normal breath sounds. No stridor. No wheezing.   Chest:      Chest wall: No tenderness.   Abdominal:      General: There is no distension.      Palpations: Abdomen is soft.      Tenderness: There is no abdominal tenderness.   Musculoskeletal:         General: Normal range of motion.      Cervical back: Normal range of motion.   Lymphadenopathy:      Cervical: No cervical adenopathy.   Skin:     General: Skin is warm and dry.   Neurological:      Mental Status: He is alert and oriented to person, place, and time.      Motor: No abnormal muscle tone.      Coordination: Coordination normal.   Psychiatric:         Behavior: Behavior normal.         Thought Content: Thought content normal.         Judgment: Judgment normal.       Assessment & Plan   Problems Addressed this Visit          Cardiac and Vasculature    Essential hypertension    Relevant Orders    Comprehensive metabolic panel    CBC No Differential       Endocrine and Metabolic    Hypogonadism in male    Type 2 diabetes mellitus with hyperglycemia, without long-term current use of insulin    Relevant Orders    Hemoglobin A1c    Microalbumin / Creatinine Urine Ratio - Urine, Clean Catch     Other Visit Diagnoses       Irregular heart rhythm    -  Primary    Relevant Orders    Holter Monitor - 72 Hour Up To 15 Days    Ambulatory Referral to Cardiology    Adult Transthoracic Echo Complete W/ Cont if Necessary Per Protocol     TSH    T4, free    Mixed hyperlipidemia        Relevant Orders    Lipid panel          Diagnoses         Codes Comments    Irregular heart rhythm    -  Primary ICD-10-CM: I49.9  ICD-9-CM: 427.9     Hypogonadism in male     ICD-10-CM: E29.1  ICD-9-CM: 257.2     Type 2 diabetes mellitus with hyperglycemia, without long-term current use of insulin     ICD-10-CM: E11.65  ICD-9-CM: 250.00, 790.29     Essential hypertension     ICD-10-CM: I10  ICD-9-CM: 401.9     Mixed hyperlipidemia     ICD-10-CM: E78.2  ICD-9-CM: 272.2           1. Irregular heart rhythm, new, needs further evaluation.  Patient reports he has had this of these episodes in the past, however, they were infrequent and controlled with atenolol. He is now having an increased frequency of episodes. Therefore, recommend a Holter monitor, referral to cardiology, echocardiogram, and lab testing. We will notify patient of results.    2. Diabetes mellitus, chronic, uncontrolled with hyperglycemia.  Most recent hemoglobin A1c uncontrolled at 7.5. We will reevaluate with repeat hemoglobin A1c. Patient did stop taking Rybelsus. Advised that we can try to control your diet and lifestyle. If it is still above goal, we will need to look into other medications.    3. Hypertension, chronic, controlled.  Continue with current medication.    4. Hypogonadism, chronic, stable.  Continue with current replacement. We will check labs.    5. Hyperlipidemia, chronic, improving.  Continue on atorvastatin.    Transcribed from ambient dictation for Dawna Daniels MD by Leah Schmidt.  07/11/23   12:22 CDT    Patient or patient representative verbalized consent to the visit recording.  I have personally performed the services described in this document as transcribed by the above individual, and it is both accurate and complete.          This document has been electronically signed by Dawna Daniels MD on July 25, 2023 19:45 CDT

## 2023-08-18 ENCOUNTER — HOSPITAL ENCOUNTER (OUTPATIENT)
Dept: CARDIOLOGY | Facility: HOSPITAL | Age: 73
Discharge: HOME | End: 2023-08-18
Payer: COMMERCIAL

## 2023-08-18 VITALS
HEIGHT: 69 IN | SYSTOLIC BLOOD PRESSURE: 117 MMHG | WEIGHT: 172 LBS | DIASTOLIC BLOOD PRESSURE: 66 MMHG | BODY MASS INDEX: 25.48 KG/M2

## 2023-08-18 DIAGNOSIS — I49.9 IRREGULAR HEART RHYTHM: ICD-10-CM

## 2023-08-18 LAB
BH CV ECHO MEAS - AO MAX PG: 4.7 MMHG
BH CV ECHO MEAS - AO MEAN PG: 3 MMHG
BH CV ECHO MEAS - AO ROOT DIAM: 3.2 CM
BH CV ECHO MEAS - AO V2 MAX: 108 CM/SEC
BH CV ECHO MEAS - AO V2 VTI: 27 CM
BH CV ECHO MEAS - AVA(I,D): 2.9 CM2
BH CV ECHO MEAS - EDV(CUBED): 117.6 ML
BH CV ECHO MEAS - EDV(MOD-SP2): 87 ML
BH CV ECHO MEAS - EDV(MOD-SP4): 91.6 ML
BH CV ECHO MEAS - EF(MOD-BP): 53.7 %
BH CV ECHO MEAS - EF(MOD-SP2): 56.8 %
BH CV ECHO MEAS - EF(MOD-SP4): 51.2 %
BH CV ECHO MEAS - ESV(CUBED): 24.4 ML
BH CV ECHO MEAS - ESV(MOD-SP2): 37.6 ML
BH CV ECHO MEAS - ESV(MOD-SP4): 44.7 ML
BH CV ECHO MEAS - FS: 40.8 %
BH CV ECHO MEAS - IVS/LVPW: 0.9 CM
BH CV ECHO MEAS - IVSD: 0.9 CM
BH CV ECHO MEAS - LA DIMENSION: 3.5 CM
BH CV ECHO MEAS - LAT PEAK E' VEL: 9.4 CM/SEC
BH CV ECHO MEAS - LV DIASTOLIC VOL/BSA (35-75): 47.3 CM2
BH CV ECHO MEAS - LV MASS(C)D: 164.3 GRAMS
BH CV ECHO MEAS - LV MAX PG: 3.8 MMHG
BH CV ECHO MEAS - LV MEAN PG: 2 MMHG
BH CV ECHO MEAS - LV SYSTOLIC VOL/BSA (12-30): 23.1 CM2
BH CV ECHO MEAS - LV V1 MAX: 97.7 CM/SEC
BH CV ECHO MEAS - LV V1 VTI: 22.4 CM
BH CV ECHO MEAS - LVIDD: 4.9 CM
BH CV ECHO MEAS - LVIDS: 2.9 CM
BH CV ECHO MEAS - LVOT AREA: 3.5 CM2
BH CV ECHO MEAS - LVOT DIAM: 2.1 CM
BH CV ECHO MEAS - LVPWD: 1 CM
BH CV ECHO MEAS - MED PEAK E' VEL: 6.4 CM/SEC
BH CV ECHO MEAS - MV A MAX VEL: 44.1 CM/SEC
BH CV ECHO MEAS - MV DEC TIME: 0.26 MSEC
BH CV ECHO MEAS - MV E MAX VEL: 58.3 CM/SEC
BH CV ECHO MEAS - MV E/A: 1.32
BH CV ECHO MEAS - MV MAX PG: 2.04 MMHG
BH CV ECHO MEAS - MV MEAN PG: 1 MMHG
BH CV ECHO MEAS - MV V2 VTI: 25.2 CM
BH CV ECHO MEAS - MVA(VTI): 3.1 CM2
BH CV ECHO MEAS - PA V2 MAX: 114 CM/SEC
BH CV ECHO MEAS - RAP SYSTOLE: 5 MMHG
BH CV ECHO MEAS - RV MAX PG: 2.14 MMHG
BH CV ECHO MEAS - RV V1 MAX: 73.1 CM/SEC
BH CV ECHO MEAS - RV V1 VTI: 18.5 CM
BH CV ECHO MEAS - RVDD: 3.4 CM
BH CV ECHO MEAS - RVSP: 24 MMHG
BH CV ECHO MEAS - SI(MOD-SP2): 25.5 ML/M2
BH CV ECHO MEAS - SI(MOD-SP4): 24.2 ML/M2
BH CV ECHO MEAS - SV(LVOT): 77.6 ML
BH CV ECHO MEAS - SV(MOD-SP2): 49.4 ML
BH CV ECHO MEAS - SV(MOD-SP4): 46.9 ML
BH CV ECHO MEAS - TAPSE (>1.6): 2.6 CM
BH CV ECHO MEAS - TR MAX PG: 19 MMHG
BH CV ECHO MEAS - TR MAX VEL: 218 CM/SEC
BH CV ECHO MEASUREMENTS AVERAGE E/E' RATIO: 7.38
BH CV XLRA - TDI S': 10.7 CM/SEC
LEFT ATRIUM VOLUME INDEX: 33.4 ML/M2
LEFT ATRIUM VOLUME: 67.5 ML

## 2023-08-18 PROCEDURE — 93306 TTE W/DOPPLER COMPLETE: CPT

## 2023-08-29 ENCOUNTER — OFFICE VISIT (OUTPATIENT)
Dept: CARDIOLOGY | Facility: CLINIC | Age: 73
End: 2023-08-29
Payer: COMMERCIAL

## 2023-08-29 VITALS
HEIGHT: 69 IN | OXYGEN SATURATION: 98 % | DIASTOLIC BLOOD PRESSURE: 66 MMHG | HEART RATE: 49 BPM | WEIGHT: 173 LBS | SYSTOLIC BLOOD PRESSURE: 120 MMHG | BODY MASS INDEX: 25.62 KG/M2

## 2023-08-29 DIAGNOSIS — I10 ESSENTIAL HYPERTENSION: ICD-10-CM

## 2023-08-29 DIAGNOSIS — E78.2 MIXED HYPERLIPIDEMIA: ICD-10-CM

## 2023-08-29 DIAGNOSIS — I47.1 PAROXYSMAL SVT (SUPRAVENTRICULAR TACHYCARDIA): Primary | ICD-10-CM

## 2023-08-29 PROBLEM — I47.10 PAROXYSMAL SVT (SUPRAVENTRICULAR TACHYCARDIA): Status: ACTIVE | Noted: 2023-08-29

## 2023-08-29 PROCEDURE — 93000 ELECTROCARDIOGRAM COMPLETE: CPT | Performed by: INTERNAL MEDICINE

## 2023-08-29 PROCEDURE — 99204 OFFICE O/P NEW MOD 45 MIN: CPT | Performed by: INTERNAL MEDICINE

## 2023-08-29 NOTE — PROGRESS NOTES
Reason for Visit: Irregular heart rhythm.    HPI:  Saman Ovalles is a 72 y.o. male is being seen for consultation today at the request of Dawna Daniels MD for evaluation of an irregular heart rhythm.  He has had palpitations and irregular heart beats since he was in his 20's.  Atenolol has controlled this until recently.  He has noticed more palpitations since taking Trulicity for his diabetes.  He is no longer taking this and the palpitations improved, but have not resolved.  He is interested in considering ablation.  He exercises regularly with strength training, hiking and kayaking.      Previous Cardiac Testing and Procedures:  -Holter monitor (7/20/2023) numerous occurrences of SVT up to 1 minute and 17 seconds at an average rate of 155 bpm that did correlate with symptoms  -Echo (8/18/2023) EF 51-55%, grade 1 diastolic dysfunction, mild LA enlargement, normal RV size and function, no significant valve dysfunction, normal RVSP    Lab data:  -Lipid panel (7/13/2023) total cholesterol 158, HDL 28, LDL not calculated, triglycerides 468  -BMP (7/13/2023) creatinine 1.31, potassium 4.1, sodium 139  -TSH (7/13/2023) 1.25  -Hemoglobin A1c (7/13/2023) 6.9%    Patient Active Problem List   Diagnosis    Hypogonadism in male    Hypertriglyceridemia    Anxiety and depression    Essential hypertension    Overweight    Decreased GFR    Pre-diabetes    Elevated hemoglobin    Type 2 diabetes mellitus with hyperglycemia, without long-term current use of insulin    Paroxysmal SVT (supraventricular tachycardia)       Social History     Tobacco Use    Smoking status: Never    Smokeless tobacco: Never    Tobacco comments:     never smoked   Vaping Use    Vaping Use: Never used   Substance Use Topics    Alcohol use: Yes     Comment: occ    Drug use: Never       Family History   Problem Relation Age of Onset    Heart disease Father     Cancer Sister        The following portions of the patient's history were reviewed and  updated as appropriate: allergies, current medications, past family history, past medical history, past social history, past surgical history, and problem list.      Current Outpatient Medications:     amLODIPine (NORVASC) 5 MG tablet, Take 1 tablet by mouth Daily., Disp: 90 tablet, Rfl: 1    aspirin 81 MG chewable tablet, Chew 1 tablet Daily., Disp: , Rfl:     atenolol (TENORMIN) 50 MG tablet, TAKE 1 TABLET DAILY, Disp: 90 tablet, Rfl: 3    atorvastatin (LIPITOR) 40 MG tablet, TAKE 1 TABLET EVERY NIGHT, Disp: 90 tablet, Rfl: 3    Cholecalciferol (VITAMIN D3) 125 MCG (5000 UT) capsule capsule, Take 1 capsule by mouth Daily., Disp: , Rfl:     fenofibrate 160 MG tablet, TAKE 1 TABLET DAILY, Disp: 90 tablet, Rfl: 3    hydroCHLOROthiazide (HYDRODIURIL) 25 MG tablet, Take 1 tablet by mouth Daily., Disp: 90 tablet, Rfl: 3    losartan (COZAAR) 50 MG tablet, Take 1 tablet by mouth 2 (Two) Times a Day., Disp: 180 tablet, Rfl: 3    Omega-3 Fatty Acids (FISH OIL) 1000 MG capsule capsule, Take 2 capsules by mouth 2 (Two) Times a Day With Meals., Disp: , Rfl:     Testosterone 10 MG/ACT (2%) gel, APPLY THE CONTENTS OF 6    PUMP ACTUATIONS ON THE SKINAS DIRECTED BY PROVIDER    DAILY, Disp: 240 g, Rfl: 0    venlafaxine XR (EFFEXOR-XR) 75 MG 24 hr capsule, TAKE 1 CAPSULE DAILY, Disp: 90 capsule, Rfl: 3    vitamin C (ASCORBIC ACID) 500 MG tablet, Take 1 tablet by mouth Daily., Disp: , Rfl:     glucose blood test strip, 1 each by Other route Daily. Use as instructed (Patient not taking: Reported on 8/29/2023), Disp: 100 each, Rfl: 11    glucose monitor monitoring kit, 1 each Daily. (Patient not taking: Reported on 8/29/2023), Disp: 1 each, Rfl: 0    Lancets (freestyle) lancets, 1 each by Other route 3 (Three) Times a Day As Needed (hypoglycemia). (Patient not taking: Reported on 8/29/2023), Disp: 100 each, Rfl: 12    Review of Systems   Constitutional: Negative for chills and fever.   Cardiovascular:  Positive for irregular heartbeat  "and palpitations. Negative for chest pain, paroxysmal nocturnal dyspnea and syncope.   Respiratory:  Negative for cough and shortness of breath.    Skin:  Negative for rash.   Gastrointestinal:  Negative for abdominal pain and heartburn.   Neurological:  Negative for dizziness and numbness.     Objective   /66 (BP Location: Left arm, Patient Position: Sitting, Cuff Size: Adult)   Pulse (!) 49   Ht 175.3 cm (69\")   Wt 78.5 kg (173 lb)   SpO2 98%   BMI 25.55 kg/mý   Constitutional:       Appearance: Well-developed.   HENT:      Head: Normocephalic and atraumatic.   Pulmonary:      Effort: Pulmonary effort is normal.      Breath sounds: Normal breath sounds.   Cardiovascular:      Normal rate. Regular rhythm.      Murmurs: There is no murmur.      No gallop.  No click.   Edema:     Peripheral edema absent.   Skin:     General: Skin is warm and dry.   Neurological:      Mental Status: Alert and oriented to person, place, and time.       ECG 12 Lead    Date/Time: 8/29/2023 12:30 PM  Performed by: David Calderón MD  Authorized by: David Calderón MD   Previous ECG: no previous ECG available  Rhythm: sinus rhythm  Rate: bradycardic  QRS axis: left  Other findings: non-specific ST-T wave changes          ICD-10-CM ICD-9-CM   1. Paroxysmal SVT (supraventricular tachycardia)  I47.1 427.0   2. Essential hypertension  I10 401.9   3. Mixed hyperlipidemia  E78.2 272.2         Assessment/Plan:  1.  Paroxysmal SVT: Rhythm strips from Holter monitor on 7/20/2023 were personally reviewed.  There were episodes of sustained SVT that did correlate with symptoms.   Echo from 8/18/2023 was reviewed and showed low normal LV systolic function with a EF of 51 to 55%.  Symptoms have improved since stopping Trulicity but still present.  Symptoms are present despite beta-blocker therapy with atenolol.  He should continue this.  We discussed treatment options including antiarrhythmic therapy versus EP consultation for consideration " of possible ablation.  He is very interested in EP evaluation.    2.  Essential hypertension: Blood pressures well controlled today.  Continue atenolol, amlodipine, and HCTZ.    3.  Mixed hyperlipidemia: Lipid panel from 7/13/2023 showed low HDL and significantly elevated triglycerides.  Continue atorvastatin, omega-3 fish oil, and fenofibrate.

## 2023-08-29 NOTE — LETTER
August 29, 2023     Dawna Daniels MD  [address]  [facility] [address]    Patient: Saman Ovalles   YOB: 1950   Date of Visit: 8/29/2023       Dear Dawna Daniels MD,    Thank you for referring Saman Ovalles to me for evaluation. Below is a copy of my consult note.    If you have questions, please do not hesitate to call me. I look forward to following Saman along with you.         Sincerely,        David Calderón MD        CC: No Recipients      Reason for Visit: Irregular heart rhythm.    HPI:  Saman Ovalles is a 72 y.o. male is being seen for consultation today at the request of Dawna Daniels MD for evaluation of an irregular heart rhythm.  He has had palpitations and irregular heart beats since he was in his 20's.  Atenolol has controlled this until recently.  He has noticed more palpitations since taking Trulicity for his diabetes.  He is no longer taking this and the palpitations improved, but have not resolved.  He is interested in considering ablation.  He exercises regularly with strength training, hiking and kayaking.      Previous Cardiac Testing and Procedures:  -Holter monitor (7/20/2023) numerous occurrences of SVT up to 1 minute and 17 seconds at an average rate of 155 bpm that did correlate with symptoms  -Echo (8/18/2023) EF 51-55%, grade 1 diastolic dysfunction, mild LA enlargement, normal RV size and function, no significant valve dysfunction, normal RVSP    Lab data:  -Lipid panel (7/13/2023) total cholesterol 158, HDL 28, LDL not calculated, triglycerides 468  -BMP (7/13/2023) creatinine 1.31, potassium 4.1, sodium 139  -TSH (7/13/2023) 1.25  -Hemoglobin A1c (7/13/2023) 6.9%    Patient Active Problem List   Diagnosis    Hypogonadism in male    Hypertriglyceridemia    Anxiety and depression    Essential hypertension    Overweight    Decreased GFR    Pre-diabetes    Elevated hemoglobin    Type 2 diabetes mellitus with hyperglycemia, without long-term  current use of insulin    Paroxysmal SVT (supraventricular tachycardia)       Social History     Tobacco Use    Smoking status: Never    Smokeless tobacco: Never    Tobacco comments:     never smoked   Vaping Use    Vaping Use: Never used   Substance Use Topics    Alcohol use: Yes     Comment: occ    Drug use: Never       Family History   Problem Relation Age of Onset    Heart disease Father     Cancer Sister        The following portions of the patient's history were reviewed and updated as appropriate: allergies, current medications, past family history, past medical history, past social history, past surgical history, and problem list.      Current Outpatient Medications:     amLODIPine (NORVASC) 5 MG tablet, Take 1 tablet by mouth Daily., Disp: 90 tablet, Rfl: 1    aspirin 81 MG chewable tablet, Chew 1 tablet Daily., Disp: , Rfl:     atenolol (TENORMIN) 50 MG tablet, TAKE 1 TABLET DAILY, Disp: 90 tablet, Rfl: 3    atorvastatin (LIPITOR) 40 MG tablet, TAKE 1 TABLET EVERY NIGHT, Disp: 90 tablet, Rfl: 3    Cholecalciferol (VITAMIN D3) 125 MCG (5000 UT) capsule capsule, Take 1 capsule by mouth Daily., Disp: , Rfl:     fenofibrate 160 MG tablet, TAKE 1 TABLET DAILY, Disp: 90 tablet, Rfl: 3    hydroCHLOROthiazide (HYDRODIURIL) 25 MG tablet, Take 1 tablet by mouth Daily., Disp: 90 tablet, Rfl: 3    losartan (COZAAR) 50 MG tablet, Take 1 tablet by mouth 2 (Two) Times a Day., Disp: 180 tablet, Rfl: 3    Omega-3 Fatty Acids (FISH OIL) 1000 MG capsule capsule, Take 2 capsules by mouth 2 (Two) Times a Day With Meals., Disp: , Rfl:     Testosterone 10 MG/ACT (2%) gel, APPLY THE CONTENTS OF 6    PUMP ACTUATIONS ON THE SKINAS DIRECTED BY PROVIDER    DAILY, Disp: 240 g, Rfl: 0    venlafaxine XR (EFFEXOR-XR) 75 MG 24 hr capsule, TAKE 1 CAPSULE DAILY, Disp: 90 capsule, Rfl: 3    vitamin C (ASCORBIC ACID) 500 MG tablet, Take 1 tablet by mouth Daily., Disp: , Rfl:     glucose blood test strip, 1 each by Other  "route Daily. Use as instructed (Patient not taking: Reported on 8/29/2023), Disp: 100 each, Rfl: 11    glucose monitor monitoring kit, 1 each Daily. (Patient not taking: Reported on 8/29/2023), Disp: 1 each, Rfl: 0    Lancets (freestyle) lancets, 1 each by Other route 3 (Three) Times a Day As Needed (hypoglycemia). (Patient not taking: Reported on 8/29/2023), Disp: 100 each, Rfl: 12    Review of Systems   Constitutional: Negative for chills and fever.   Cardiovascular:  Positive for irregular heartbeat and palpitations. Negative for chest pain, paroxysmal nocturnal dyspnea and syncope.   Respiratory:  Negative for cough and shortness of breath.    Skin:  Negative for rash.   Gastrointestinal:  Negative for abdominal pain and heartburn.   Neurological:  Negative for dizziness and numbness.     Objective  /66 (BP Location: Left arm, Patient Position: Sitting, Cuff Size: Adult)   Pulse (!) 49   Ht 175.3 cm (69\")   Wt 78.5 kg (173 lb)   SpO2 98%   BMI 25.55 kg/mý   Constitutional:       Appearance: Well-developed.   HENT:      Head: Normocephalic and atraumatic.   Pulmonary:      Effort: Pulmonary effort is normal.      Breath sounds: Normal breath sounds.   Cardiovascular:      Normal rate. Regular rhythm.      Murmurs: There is no murmur.      No gallop.  No click.   Edema:     Peripheral edema absent.   Skin:     General: Skin is warm and dry.   Neurological:      Mental Status: Alert and oriented to person, place, and time.       ECG 12 Lead    Date/Time: 8/29/2023 12:30 PM  Performed by: David Calderón MD  Authorized by: David Calderón MD   Previous ECG: no previous ECG available  Rhythm: sinus rhythm  Rate: bradycardic  QRS axis: left  Other findings: non-specific ST-T wave changes          ICD-10-CM ICD-9-CM   1. Paroxysmal SVT (supraventricular tachycardia)  I47.1 427.0   2. Essential hypertension  I10 401.9   3. Mixed hyperlipidemia  E78.2 272.2         Assessment/Plan:  1.  Paroxysmal SVT: " Rhythm strips from Holter monitor on 7/20/2023 were personally reviewed.  There were episodes of sustained SVT that did correlate with symptoms.   Echo from 8/18/2023 was reviewed and showed low normal LV systolic function with a EF of 51 to 55%.  Symptoms have improved since stopping Trulicity but still present.  Symptoms are present despite beta-blocker therapy with atenolol.  He should continue this.  We discussed treatment options including antiarrhythmic therapy versus EP consultation for consideration of possible ablation.  He is very interested in EP evaluation.    2.  Essential hypertension: Blood pressures well controlled today.  Continue atenolol, amlodipine, and HCTZ.    3.  Mixed hyperlipidemia: Lipid panel from 7/13/2023 showed low HDL and significantly elevated triglycerides.  Continue atorvastatin, omega-3 fish oil, and fenofibrate.

## 2023-09-24 DIAGNOSIS — E29.1 HYPOGONADISM IN MALE: ICD-10-CM

## 2023-09-26 NOTE — TELEPHONE ENCOUNTER
Rx Refill Note  Requested Prescriptions     Pending Prescriptions Disp Refills    Testosterone 10 MG/ACT (2%) gel [Pharmacy Med Name: TESTOST(F) GEL PUM 2%] 240 g 0     Sig: APPLY THE CONTENTS OF 6    PUMP ACTUATIONS ON THE SKINAS DIRECTED BY PROVIDER    DAILY      Last office visit with prescribing clinician: 7/11/2023   Next office visit with prescribing clinician: 10/30/2023     Last refill: 6/22/2023  CC: 4/4/2023  UDS: 4/4/2023        Ana Paredes CMA  09/26/23, 15:19 CDT

## 2023-09-28 RX ORDER — TESTOSTERONE 10 MG/.5G
GEL, METERED TOPICAL
Qty: 240 G | Refills: 0 | Status: SHIPPED | OUTPATIENT
Start: 2023-09-28

## 2023-10-13 DIAGNOSIS — I10 ESSENTIAL HYPERTENSION: ICD-10-CM

## 2023-10-13 RX ORDER — ATENOLOL 50 MG/1
TABLET ORAL
Qty: 90 TABLET | Refills: 3 | Status: SHIPPED | OUTPATIENT
Start: 2023-10-13

## 2023-10-13 RX ORDER — HYDROCHLOROTHIAZIDE 25 MG/1
25 TABLET ORAL DAILY
Qty: 90 TABLET | Refills: 3 | Status: SHIPPED | OUTPATIENT
Start: 2023-10-13

## 2023-10-19 DIAGNOSIS — I10 ESSENTIAL HYPERTENSION: ICD-10-CM

## 2023-10-19 RX ORDER — LOSARTAN POTASSIUM 50 MG/1
50 TABLET ORAL 2 TIMES DAILY
Qty: 180 TABLET | Refills: 3 | Status: SHIPPED | OUTPATIENT
Start: 2023-10-19

## 2023-10-25 DIAGNOSIS — I10 ESSENTIAL HYPERTENSION: ICD-10-CM

## 2023-10-26 RX ORDER — AMLODIPINE BESYLATE 5 MG/1
5 TABLET ORAL DAILY
Qty: 90 TABLET | Refills: 1 | Status: SHIPPED | OUTPATIENT
Start: 2023-10-26

## 2023-10-30 ENCOUNTER — OFFICE VISIT (OUTPATIENT)
Dept: FAMILY MEDICINE CLINIC | Facility: CLINIC | Age: 73
End: 2023-10-30
Payer: COMMERCIAL

## 2023-10-30 VITALS
TEMPERATURE: 98 F | HEART RATE: 60 BPM | HEIGHT: 69 IN | DIASTOLIC BLOOD PRESSURE: 71 MMHG | SYSTOLIC BLOOD PRESSURE: 149 MMHG | BODY MASS INDEX: 26.22 KG/M2 | RESPIRATION RATE: 20 BRPM | WEIGHT: 177 LBS | OXYGEN SATURATION: 96 %

## 2023-10-30 DIAGNOSIS — Z00.00 WELL ADULT EXAM: Primary | ICD-10-CM

## 2023-10-30 DIAGNOSIS — E78.1 HYPERTRIGLYCERIDEMIA: ICD-10-CM

## 2023-10-30 DIAGNOSIS — F32.A ANXIETY AND DEPRESSION: ICD-10-CM

## 2023-10-30 DIAGNOSIS — F41.9 ANXIETY AND DEPRESSION: ICD-10-CM

## 2023-10-30 PROCEDURE — 99397 PER PM REEVAL EST PAT 65+ YR: CPT | Performed by: FAMILY MEDICINE

## 2023-10-30 RX ORDER — ATORVASTATIN CALCIUM 40 MG/1
40 TABLET, FILM COATED ORAL NIGHTLY
Qty: 90 TABLET | Refills: 3 | Status: SHIPPED | OUTPATIENT
Start: 2023-10-30

## 2023-10-30 RX ORDER — VENLAFAXINE HYDROCHLORIDE 75 MG/1
75 CAPSULE, EXTENDED RELEASE ORAL DAILY
Qty: 90 CAPSULE | Refills: 3 | Status: SHIPPED | OUTPATIENT
Start: 2023-10-30

## 2023-10-30 RX ORDER — FENOFIBRATE 160 MG/1
160 TABLET ORAL DAILY
Qty: 90 TABLET | Refills: 3 | Status: SHIPPED | OUTPATIENT
Start: 2023-10-30

## 2023-10-30 NOTE — PROGRESS NOTES
Subjective   Saman Ovalles is a 73 y.o. male.     History of Present Illness     Saman Ovalles is a 73-year-old male who presents today for an adult well exam.    The patient denies any concerns or issues today.    Heart issues  The patient reports that he is still having heart issues. He believes that the sugar-lowering medications were making him dizzy. He notes that when he stopped taking them, it has been going down, but it is still not like it used to be before. He states that he is still getting them much more often. He notes that the atenolol is not doing a lot for him. He states that he went to the cardiologist, Dr. Hicks, at the end of 08/2023. He notes that he had an echocardiogram done. He states that he was told that his heart is really strong and it was working like it should be working. He notes that his issue is electrical. He states that he is getting zapped from something somewhere. He notes that he made an appointment with another specialist, Dr. Rolon, on 11/21/2023. He states that it has always been a problem. He notes that 40 years ago, they did not have echocardiograms and they did not know what was causing anything. He states that Dr. Rolon is going to look at him and if he thinks he can fix it, he is going to go in there outpatient up into the heart and whatever is causing it, he is going to have an ablation. He states that if that is successful, he will never have that problem again.    Hypertension  The patient reports that his blood pressure was high today. He states that it is always low. He notes that he had something in his stomach before he got here and got an avocado toast. He states that it had red pepper flakes on it and Serratia. He states that it was really spicy.    Health maintenance  The patient reports that he just joined the [employer]. He states that they put a new one on 10/07/2023. He states that it is a couple of miles in his house. He states that he will be doing a lot  "more cardio. He states that he is doing lifting. He states that they have all the equipment machines. He states that he is doing a lot more cardio. He states that he is doing lifting. He states that he had blood testing done in 07/2023. He states that his total cholesterol looked good. He states that his triglycerides always stay a little bit higher. He states that his LDL cholesterol was just a little bit high at 130. He states that his kidney function and electrolytes looked really good. He states that his kidney function was at 58. He states that his most recent A1c was at 6.9, which is controlled. He states that his thyroid levels were normal. He states that his CBCs were all normal as well. He states that he is taking atorvastatin. He states that his sister just got a pacemaker. He states that she never had a heart problem in her life. He states that she works out since she was at a gym with a . He states that she just fell over. He states that it just got beat. He states that her heart has to be for 18 seconds. He states that his last eye exam was 6 months ago. He states that he has not told them that he is a diabetic. He states that he did have the retina check. He states that he is taking Effexor 75 mg once a day. He states that he has been getting that from the mail order pharmacy. He states that he would like to continue doing that. He states that he is taking testosterone gel. He states that he is taking losartan 50 mg twice a day. He states that he is taking hydrochlorothiazide. He states that he is taking fenofibrate.    The following portions of the patient's history were reviewed and updated as appropriate: allergies, current medications, past family history, past medical history, past social history, past surgical history, and problem list.        Review of Systems    Objective   Blood pressure 149/71, pulse 60, temperature 98 °F (36.7 °C), resp. rate 20, height 175.3 cm (69.02\"), " weight 80.3 kg (177 lb), SpO2 96%.  Physical Exam  Vitals and nursing note reviewed.   Constitutional:       General: He is not in acute distress.     Appearance: He is well-developed. He is not diaphoretic.   HENT:      Head: Normocephalic and atraumatic.      Right Ear: External ear normal.      Left Ear: External ear normal.      Nose: Nose normal.   Eyes:      General:         Right eye: No discharge.         Left eye: No discharge.      Conjunctiva/sclera: Conjunctivae normal.   Neck:      Thyroid: No thyromegaly.      Trachea: No tracheal deviation.   Cardiovascular:      Rate and Rhythm: Normal rate and regular rhythm.      Pulses: Normal pulses.   Pulmonary:      Effort: Pulmonary effort is normal. No respiratory distress.      Breath sounds: Normal breath sounds. No stridor. No wheezing.   Chest:      Chest wall: No tenderness.   Abdominal:      General: There is no distension.      Palpations: Abdomen is soft.      Tenderness: There is no abdominal tenderness.   Musculoskeletal:         General: Normal range of motion.      Cervical back: Normal range of motion.   Lymphadenopathy:      Cervical: No cervical adenopathy.   Skin:     General: Skin is warm and dry.   Neurological:      Mental Status: He is alert and oriented to person, place, and time.      Motor: No abnormal muscle tone.      Coordination: Coordination normal.   Psychiatric:         Behavior: Behavior normal.         Thought Content: Thought content normal.         Judgment: Judgment normal.         BMI is >= 25 and <30. (Overweight) The following options were offered after discussion;: exercise counseling/recommendations and nutrition counseling/recommendations       Assessment & Plan   Problems Addressed this Visit          Cardiac and Vasculature    Hypertriglyceridemia    Relevant Medications    fenofibrate 160 MG tablet    atorvastatin (LIPITOR) 40 MG tablet       Mental Health    Anxiety and depression    Relevant Medications     venlafaxine XR (EFFEXOR-XR) 75 MG 24 hr capsule     Other Visit Diagnoses       Well adult exam    -  Primary          Diagnoses         Codes Comments    Well adult exam    -  Primary ICD-10-CM: Z00.00  ICD-9-CM: V70.0     Anxiety and depression     ICD-10-CM: F41.9, F32.A  ICD-9-CM: 300.00, 311     Hypertriglyceridemia     ICD-10-CM: E78.1  ICD-9-CM: 272.1             1. Adult well exam  Reviewed and discussed immunizations. Patient declines flu shot today. He has had both of his pneumonia vaccines in the past. Advised on other immunizations. Encouraged diet and healthy lifestyle changes. Vital signs are overall normal. Blood pressure mildly elevated while in office today, however, normally his blood pressure is very well controlled. Therefore, I will not change any of his medications this morning. Patient's eye exam is up to date. Colon cancer screening is up to date. Patient is having regular lab testing completed. We will plan to see him back in 3 months for reevaluation.    Transcribed from ambient dictation for Dawna Daniels MD by Lisa [employer] .  10/30/23   11:06 CDT    Patient or patient representative verbalized consent to the visit recording.  I have personally performed the services described in this document as transcribed by the above individual, and it is both accurate and complete.          This document has been electronically signed by Dawna Daniels MD on November 18, 2023 13:09 CST

## 2023-11-21 ENCOUNTER — OFFICE VISIT (OUTPATIENT)
Dept: CARDIOLOGY | Facility: CLINIC | Age: 73
End: 2023-11-21
Payer: COMMERCIAL

## 2023-11-21 ENCOUNTER — PREP FOR SURGERY (OUTPATIENT)
Dept: OTHER | Facility: HOSPITAL | Age: 73
End: 2023-11-21
Payer: COMMERCIAL

## 2023-11-21 VITALS
WEIGHT: 175 LBS | DIASTOLIC BLOOD PRESSURE: 62 MMHG | BODY MASS INDEX: 26.52 KG/M2 | HEART RATE: 67 BPM | SYSTOLIC BLOOD PRESSURE: 112 MMHG | HEIGHT: 68 IN | OXYGEN SATURATION: 98 % | RESPIRATION RATE: 18 BRPM

## 2023-11-21 DIAGNOSIS — I47.10 PAROXYSMAL SVT (SUPRAVENTRICULAR TACHYCARDIA): Primary | ICD-10-CM

## 2023-11-21 RX ORDER — SODIUM CHLORIDE 9 MG/ML
40 INJECTION, SOLUTION INTRAVENOUS AS NEEDED
OUTPATIENT
Start: 2023-11-21

## 2023-11-21 RX ORDER — SODIUM CHLORIDE 0.9 % (FLUSH) 0.9 %
10 SYRINGE (ML) INJECTION EVERY 12 HOURS SCHEDULED
OUTPATIENT
Start: 2023-11-21

## 2023-11-21 RX ORDER — SODIUM CHLORIDE 0.9 % (FLUSH) 0.9 %
10 SYRINGE (ML) INJECTION AS NEEDED
OUTPATIENT
Start: 2023-11-21

## 2023-11-21 NOTE — H&P (VIEW-ONLY)
"EP NEW PATIENT VISIT    Chief Complaint  Rapid Heart Rate (NP)    Subjective        History of Present Illness    EP Problems:  1.  Paroxysmal SVT    Cardiology Problems:  1.  Hypertension  2.  Hyperlipidemia    Medical Problems:  1.  Type 2 diabetes    Saman Ovalles is a 73 y.o. male with problem list as above who presents to the clinic for evaluation of paroxysmal SVT.  He states that he has had paroxysmal SVT for multiple decades.  For a long time, this is well controlled on atenolol.  Recently, he was started on diabetes medication and found that it markedly worsened his episodes of PSVT.  He has stopped the medication since that time and feels like things have gotten somewhat better but is still having frequent episodes.  He states on when the episodes are prolonged he feels poorly with chest discomfort, fatigue, shortness of breath.  Given these findings, he was referred to EP for further evaluation.    Objective   Vital Signs:  /62 (BP Location: Right arm, Patient Position: Sitting)   Pulse 67   Resp 18   Ht 172.7 cm (68\")   Wt 79.4 kg (175 lb)   SpO2 98%   BMI 26.61 kg/m²   Estimated body mass index is 26.61 kg/m² as calculated from the following:    Height as of this encounter: 172.7 cm (68\").    Weight as of this encounter: 79.4 kg (175 lb).      Physical Exam  Vitals reviewed.   Constitutional:       Appearance: Normal appearance.   HENT:      Head: Normocephalic and atraumatic.   Eyes:      Extraocular Movements: Extraocular movements intact.      Conjunctiva/sclera: Conjunctivae normal.   Cardiovascular:      Rate and Rhythm: Normal rate and regular rhythm.      Pulses: Normal pulses.      Heart sounds: Normal heart sounds.   Pulmonary:      Effort: Pulmonary effort is normal.      Breath sounds: Normal breath sounds.   Musculoskeletal:         General: No swelling.   Neurological:      General: No focal deficit present.      Mental Status: He is alert and oriented to person, place, and " time.   Psychiatric:         Mood and Affect: Mood normal.         Judgment: Judgment normal.        Result Review :  The following data was reviewed by: Meliza Humphrey MD on 11/21/2023:    Holter monitor from 8/16/2023 directly visualized independently reviewed: Predominantly sinus rhythm, frequent episodes of SVT up to 87 seconds in duration, oftentimes terminating with sinus bradycardia and near immediate reinitiation.        ECG 12 Lead    Date/Time: 11/21/2023 11:50 PM  Performed by: Meliza Humphrey MD    Authorized by: Meliza Humphrey MD  Comparison: compared with previous ECG from 8/29/2023  Comparison to previous ECG: Ventricular rate has increased  Rate: normal  Conduction: conduction normal  QRS axis: left  Other findings: non-specific ST-T wave changes and poor R wave progression    Clinical impression: abnormal EKG              Assessment and Plan   Diagnoses and all orders for this visit:    1. Paroxysmal SVT (supraventricular tachycardia) (Primary)    Other orders  -     ECG 12 Lead        Saman Ovalles is a 73 y.o. male with problem list as above who presents to the clinic for evaluation of paroxysmal SVT.  The episodes are relatively long in nature lasting up to 87 seconds in duration, however they also will often times near immediately restarted after terminating.  This does appear to be consistent with long RP tachycardia, most likely atrial tachycardia.  We discussed available treatment options and he would like definitive therapy for these.  As such, I have discussed risks, benefits, and alternatives of an electrophysiology study and ablation for supraventricular tachycardia with the patient.  Alternatives discussed include continued observation and medical management.  An electrophysiology study with ablation is an inherently high risk procedure with possible complications that include but are not limited to vascular access complications, internal bleeding, tamponade requiring pericardiocentesis or  cardiac surgery, stroke, MI, embolism, myocardial injury, injury to the normal conduction system requiring a pacemaker, and ultimately death.  We also discussed that given the uncertain nature of the diagnosis, therapeutic efficacy can be variable.  Possibilities of recurrent arrhythmias and the possible need for additional procedures and/or medical therapy was discussed. Questions asked were appropriately answered.  No guarantees were made or implied.   Despite this, they would still like to proceed.    Plan:  -Schedule for SVT ablation  -Hold atenolol 48 hours prior to the procedure  -No additional medication changes for now         Follow Up   Return in about 2 months (around 1/21/2024).  Patient was given instructions and counseling regarding his condition or for health maintenance advice. Please see specific information pulled into the AVS if appropriate.     Part of this note may be an electronic transcription/translation of spoken language to printed text using the Dragon Dictation System.

## 2023-11-21 NOTE — PATIENT INSTRUCTIONS
2 month follow up  No medication changes for now  Stop atenolol 48h prior to the procedure  Schedule for ablation in approximately 1 month

## 2023-12-02 ENCOUNTER — NURSE TRIAGE (OUTPATIENT)
Dept: CALL CENTER | Facility: HOSPITAL | Age: 73
End: 2023-12-02
Payer: COMMERCIAL

## 2023-12-02 NOTE — TELEPHONE ENCOUNTER
"Stated he is out of refills through [facility] for his Atorvastatin EPIC checked. Script and 3 refills sent in on 10/30/2023. VM left.     Reason for Disposition   [1] Caller requesting NON-URGENT health information AND [2] PCP's office is the best resource    Additional Information   Negative: [1] Caller is not with the adult (patient) AND [2] reporting urgent symptoms   Negative: Lab result questions   Negative: Medication questions   Negative: Caller can't be reached by phone   Negative: Caller has already spoken to PCP or another triager   Negative: RN needs further essential information from caller in order to complete triage   Negative: Requesting regular office appointment    Answer Assessment - Initial Assessment Questions  1. REASON FOR CALL or QUESTION: \"What is your reason for calling today?\" or \"How can I best help you?\" or \"What question do you have that I can help answer?\"      Stated he is out of refills through [facility] for his Atorvastatin EPIC checked. Script and 3 refills sent in on 10/30/2023. VM left.    Protocols used: Information Only Call - No Triage-ADULT-    "

## 2023-12-18 NOTE — DISCHARGE INSTRUCTIONS
Post-EP study Instructions    ACTIVITY    Avoid driving, operating machinery, or alcohol consumption for 24 hours after receiving sedation. In addition, avoid signing legal documents or participating in legal proceedings.    You may resume normal activities after 24 hours except for the following:    Avoid heavy lifting (no more than 10 pounds) and vigorous activities for a minimum of 7 days. This includes activities such as jogging, exercise classes, sports activities, etc.    You may resume walking and other normal activities.    Avoid sitting more than 2 consecutive hours during waking hours for the first 3 days. If you are traveling, stop for brief (5 minutes) walks every two hours.    MEDICATIONS   You may stop atenolol or take it as needed  Increase aspirin to 325mg daily for 30 days and then go back to 81 mg daily    MEDICAL FOLLOW UP   EP clinic follow up with Radha Santana on 1/22/2024..    PLEASE NOTIFY US IF YOU DEVELOP    Fever of 101 or greater during your first few days at home    The occurrence of a new, persistent cough or unexplained shortness of breath.    A groin bruise may be expected. Initial soreness and discomfort should improve over the first few days. If you continue to have discomfort or if bruising is excessive, please call us.    Patients often experience palpitations during the healing period.    Please call if you have an episode of palpitations accompanied by fast heart rate greater than 120 beats per minute for extended period that last longer than 1-2 days.    Mild chest soreness is common and may be treated with Tylenol, Advil, or Motrin.  This soreness typically worsens when taking deep breaths.  If you notice these symptoms, start one of these medications according to the package directions and continue for 2-3 days. If your symptoms are not relieved or become severe, please call us.      REASONS TO GO TO THE EMERGENCY ROOM FOR EVALUATION:   Severe chest pain  Significant  shortness of breath at rest  Near passing out or passing out episodes soon after your ablation  Symptoms of chest pain or shortness of breath associated with low blood pressure (reading less than 90 for the top number / systolic blood pressure)  Brisk bleeding or significant swelling from the groin where IVs were placed  Any concerns that an emergent or life threatening complication is occurring    If you have a clinical questions between the hours of 8am and 4pm, Monday - Friday please call the office and let us know your concern. Please leave a message if your call is not an emergency.    For emergencies, please go for evaluation at your nearest emergency department.    If you have a [employer] account, this an excellent way to communicate.  We[employer] messages are checked Monday through Friday. Please allow 24-36 hours for your message to be answered.

## 2023-12-19 ENCOUNTER — TELEPHONE (OUTPATIENT)
Dept: CARDIOLOGY | Facility: CLINIC | Age: 73
End: 2023-12-19
Payer: COMMERCIAL

## 2023-12-19 NOTE — TELEPHONE ENCOUNTER
I spoke with Mr. Ovalles about his upcoming ablation.  He was well informed about the procedure from prior discussion with Dr. Humphrey.  We discussed the procedure at length including risks, anesthesia, intra-op procedures, recovery, bedrest, sheath removal, discharge criteria, and normal post-procedure expectations.  I answered a few remaining questions. Mr. Ovalles verbalized understanding and he is ready to proceed.       Ana Moreno RN

## 2023-12-21 ENCOUNTER — ANESTHESIA (OUTPATIENT)
Dept: CARDIOLOGY | Facility: HOSPITAL | Age: 73
End: 2023-12-21
Payer: COMMERCIAL

## 2023-12-21 ENCOUNTER — ANESTHESIA EVENT (OUTPATIENT)
Dept: CARDIOLOGY | Facility: HOSPITAL | Age: 73
End: 2023-12-21
Payer: COMMERCIAL

## 2023-12-21 ENCOUNTER — HOSPITAL ENCOUNTER (OUTPATIENT)
Facility: HOSPITAL | Age: 73
Discharge: HOME | End: 2023-12-22
Attending: STUDENT IN AN ORGANIZED HEALTH CARE EDUCATION/TRAINING PROGRAM | Admitting: STUDENT IN AN ORGANIZED HEALTH CARE EDUCATION/TRAINING PROGRAM
Payer: COMMERCIAL

## 2023-12-21 ENCOUNTER — APPOINTMENT (OUTPATIENT)
Dept: GENERAL RADIOLOGY | Facility: HOSPITAL | Age: 73
End: 2023-12-21
Payer: COMMERCIAL

## 2023-12-21 DIAGNOSIS — I47.10 PAROXYSMAL SVT (SUPRAVENTRICULAR TACHYCARDIA): ICD-10-CM

## 2023-12-21 DIAGNOSIS — I10 ESSENTIAL HYPERTENSION: ICD-10-CM

## 2023-12-21 PROBLEM — Q24.9 CARDIAC ABNORMALITY: Status: ACTIVE | Noted: 2023-12-21

## 2023-12-21 LAB
ACT BLD: 234 SECONDS (ref 82–152)
ANION GAP SERPL CALCULATED.3IONS-SCNC: 11 MMOL/L (ref 5–15)
BASOPHILS # BLD AUTO: 0.02 10*3/MM3 (ref 0–0.2)
BASOPHILS NFR BLD AUTO: 0.4 % (ref 0–1.5)
BUN SERPL-MCNC: 29 MG/DL (ref 8–23)
BUN/CREAT SERPL: 22.7 (ref 7–25)
CALCIUM SPEC-SCNC: 9.4 MG/DL (ref 8.6–10.5)
CHLORIDE SERPL-SCNC: 98 MMOL/L (ref 98–107)
CO2 SERPL-SCNC: 28 MMOL/L (ref 22–29)
CREAT SERPL-MCNC: 1.28 MG/DL (ref 0.76–1.27)
DEPRECATED RDW RBC AUTO: 39.5 FL (ref 37–54)
EGFRCR SERPLBLD CKD-EPI 2021: 59.1 ML/MIN/1.73
EOSINOPHIL # BLD AUTO: 0.16 10*3/MM3 (ref 0–0.4)
EOSINOPHIL NFR BLD AUTO: 3.3 % (ref 0.3–6.2)
ERYTHROCYTE [DISTWIDTH] IN BLOOD BY AUTOMATED COUNT: 12.2 % (ref 12.3–15.4)
GLUCOSE SERPL-MCNC: 223 MG/DL (ref 65–99)
HCT VFR BLD AUTO: 44.2 % (ref 37.5–51)
HGB BLD-MCNC: 15.2 G/DL (ref 13–17.7)
IMM GRANULOCYTES # BLD AUTO: 0.01 10*3/MM3 (ref 0–0.05)
IMM GRANULOCYTES NFR BLD AUTO: 0.2 % (ref 0–0.5)
INR PPP: 0.97 (ref 0.91–1.09)
LYMPHOCYTES # BLD AUTO: 1.44 10*3/MM3 (ref 0.7–3.1)
LYMPHOCYTES NFR BLD AUTO: 30 % (ref 19.6–45.3)
MCH RBC QN AUTO: 31.3 PG (ref 26.6–33)
MCHC RBC AUTO-ENTMCNC: 34.4 G/DL (ref 31.5–35.7)
MCV RBC AUTO: 91.1 FL (ref 79–97)
MONOCYTES # BLD AUTO: 0.63 10*3/MM3 (ref 0.1–0.9)
MONOCYTES NFR BLD AUTO: 13.1 % (ref 5–12)
NEUTROPHILS NFR BLD AUTO: 2.54 10*3/MM3 (ref 1.7–7)
NEUTROPHILS NFR BLD AUTO: 53 % (ref 42.7–76)
NRBC BLD AUTO-RTO: 0 /100 WBC (ref 0–0.2)
PLATELET # BLD AUTO: 184 10*3/MM3 (ref 140–450)
PMV BLD AUTO: 9.1 FL (ref 6–12)
POTASSIUM SERPL-SCNC: 3.7 MMOL/L (ref 3.5–5.2)
PROTHROMBIN TIME: 13 SECONDS (ref 11.8–14.8)
RBC # BLD AUTO: 4.85 10*6/MM3 (ref 4.14–5.8)
SODIUM SERPL-SCNC: 137 MMOL/L (ref 136–145)
WBC NRBC COR # BLD AUTO: 4.8 10*3/MM3 (ref 3.4–10.8)

## 2023-12-21 PROCEDURE — 25810000003 SODIUM CHLORIDE 0.9 % SOLUTION: Performed by: ANESTHESIOLOGY

## 2023-12-21 PROCEDURE — 85610 PROTHROMBIN TIME: CPT | Performed by: STUDENT IN AN ORGANIZED HEALTH CARE EDUCATION/TRAINING PROGRAM

## 2023-12-21 PROCEDURE — 71045 X-RAY EXAM CHEST 1 VIEW: CPT

## 2023-12-21 PROCEDURE — 25010000002 HEPARIN (PORCINE) 1000-0.9 UT/500ML-% SOLUTION: Performed by: STUDENT IN AN ORGANIZED HEALTH CARE EDUCATION/TRAINING PROGRAM

## 2023-12-21 PROCEDURE — 80048 BASIC METABOLIC PNL TOTAL CA: CPT | Performed by: STUDENT IN AN ORGANIZED HEALTH CARE EDUCATION/TRAINING PROGRAM

## 2023-12-21 PROCEDURE — L1830 KO IMMOB CANVAS LONG PRE OTS: HCPCS | Performed by: STUDENT IN AN ORGANIZED HEALTH CARE EDUCATION/TRAINING PROGRAM

## 2023-12-21 PROCEDURE — 85347 COAGULATION TIME ACTIVATED: CPT

## 2023-12-21 PROCEDURE — 25010000002 PROPOFOL 10 MG/ML EMULSION

## 2023-12-21 PROCEDURE — C1730 CATH, EP, 19 OR FEW ELECT: HCPCS | Performed by: STUDENT IN AN ORGANIZED HEALTH CARE EDUCATION/TRAINING PROGRAM

## 2023-12-21 PROCEDURE — C1732 CATH, EP, DIAG/ABL, 3D/VECT: HCPCS | Performed by: STUDENT IN AN ORGANIZED HEALTH CARE EDUCATION/TRAINING PROGRAM

## 2023-12-21 PROCEDURE — 25010000002 HEPARIN (PORCINE) PER 1000 UNITS

## 2023-12-21 PROCEDURE — 25010000002 HEPARIN (PORCINE) 2000-0.9 UNIT/L-% SOLUTION: Performed by: STUDENT IN AN ORGANIZED HEALTH CARE EDUCATION/TRAINING PROGRAM

## 2023-12-21 PROCEDURE — 25010000002 FENTANYL CITRATE (PF) 100 MCG/2ML SOLUTION

## 2023-12-21 PROCEDURE — 25010000002 ONDANSETRON PER 1 MG

## 2023-12-21 PROCEDURE — 93622 COMP EP EVAL L VENTR PAC&REC: CPT | Performed by: STUDENT IN AN ORGANIZED HEALTH CARE EDUCATION/TRAINING PROGRAM

## 2023-12-21 PROCEDURE — C1759 CATH, INTRA ECHOCARDIOGRAPHY: HCPCS | Performed by: STUDENT IN AN ORGANIZED HEALTH CARE EDUCATION/TRAINING PROGRAM

## 2023-12-21 PROCEDURE — 93662 INTRACARDIAC ECG (ICE): CPT | Performed by: STUDENT IN AN ORGANIZED HEALTH CARE EDUCATION/TRAINING PROGRAM

## 2023-12-21 PROCEDURE — C1760 CLOSURE DEV, VASC: HCPCS | Performed by: STUDENT IN AN ORGANIZED HEALTH CARE EDUCATION/TRAINING PROGRAM

## 2023-12-21 PROCEDURE — 25010000002 DEXAMETHASONE PER 1 MG

## 2023-12-21 PROCEDURE — 85025 COMPLETE CBC W/AUTO DIFF WBC: CPT | Performed by: STUDENT IN AN ORGANIZED HEALTH CARE EDUCATION/TRAINING PROGRAM

## 2023-12-21 PROCEDURE — 25010000002 PROTAMINE SULFATE PER 10 MG

## 2023-12-21 PROCEDURE — C1894 INTRO/SHEATH, NON-LASER: HCPCS | Performed by: STUDENT IN AN ORGANIZED HEALTH CARE EDUCATION/TRAINING PROGRAM

## 2023-12-21 PROCEDURE — 93653 COMPRE EP EVAL TX SVT: CPT | Performed by: STUDENT IN AN ORGANIZED HEALTH CARE EDUCATION/TRAINING PROGRAM

## 2023-12-21 PROCEDURE — 93005 ELECTROCARDIOGRAM TRACING: CPT | Performed by: STUDENT IN AN ORGANIZED HEALTH CARE EDUCATION/TRAINING PROGRAM

## 2023-12-21 PROCEDURE — 25810000003 SODIUM CHLORIDE 0.9 % SOLUTION

## 2023-12-21 PROCEDURE — C1766 INTRO/SHEATH,STRBLE,NON-PEEL: HCPCS | Performed by: STUDENT IN AN ORGANIZED HEALTH CARE EDUCATION/TRAINING PROGRAM

## 2023-12-21 PROCEDURE — 25810000003 SODIUM CHLORIDE 0.9 % SOLUTION 500 ML FLEX CONT: Performed by: STUDENT IN AN ORGANIZED HEALTH CARE EDUCATION/TRAINING PROGRAM

## 2023-12-21 PROCEDURE — 93462 L HRT CATH TRNSPTL PUNCTURE: CPT | Performed by: STUDENT IN AN ORGANIZED HEALTH CARE EDUCATION/TRAINING PROGRAM

## 2023-12-21 RX ORDER — ACETAMINOPHEN 325 MG/1
650 TABLET ORAL EVERY 6 HOURS PRN
Status: DISCONTINUED | OUTPATIENT
Start: 2023-12-21 | End: 2023-12-22 | Stop reason: HOSPADM

## 2023-12-21 RX ORDER — HYDROCODONE BITARTRATE AND ACETAMINOPHEN 7.5; 325 MG/1; MG/1
1 TABLET ORAL EVERY 6 HOURS PRN
Status: DISCONTINUED | OUTPATIENT
Start: 2023-12-21 | End: 2023-12-22 | Stop reason: HOSPADM

## 2023-12-21 RX ORDER — PROTAMINE SULFATE 10 MG/ML
INJECTION, SOLUTION INTRAVENOUS AS NEEDED
Status: DISCONTINUED | OUTPATIENT
Start: 2023-12-21 | End: 2023-12-21 | Stop reason: SURG

## 2023-12-21 RX ORDER — ONDANSETRON 2 MG/ML
INJECTION INTRAMUSCULAR; INTRAVENOUS AS NEEDED
Status: DISCONTINUED | OUTPATIENT
Start: 2023-12-21 | End: 2023-12-21 | Stop reason: SURG

## 2023-12-21 RX ORDER — DEXAMETHASONE SODIUM PHOSPHATE 4 MG/ML
INJECTION, SOLUTION INTRA-ARTICULAR; INTRALESIONAL; INTRAMUSCULAR; INTRAVENOUS; SOFT TISSUE AS NEEDED
Status: DISCONTINUED | OUTPATIENT
Start: 2023-12-21 | End: 2023-12-21 | Stop reason: SURG

## 2023-12-21 RX ORDER — NALOXONE HCL 0.4 MG/ML
0.4 VIAL (ML) INJECTION AS NEEDED
Status: DISCONTINUED | OUTPATIENT
Start: 2023-12-21 | End: 2023-12-21 | Stop reason: HOSPADM

## 2023-12-21 RX ORDER — DROPERIDOL 2.5 MG/ML
0.62 INJECTION, SOLUTION INTRAMUSCULAR; INTRAVENOUS ONCE AS NEEDED
Status: DISCONTINUED | OUTPATIENT
Start: 2023-12-21 | End: 2023-12-21 | Stop reason: HOSPADM

## 2023-12-21 RX ORDER — SODIUM CHLORIDE 9 MG/ML
INJECTION, SOLUTION INTRAVENOUS CONTINUOUS PRN
Status: DISCONTINUED | OUTPATIENT
Start: 2023-12-21 | End: 2023-12-21 | Stop reason: SURG

## 2023-12-21 RX ORDER — SODIUM CHLORIDE 9 MG/ML
40 INJECTION, SOLUTION INTRAVENOUS AS NEEDED
Status: DISCONTINUED | OUTPATIENT
Start: 2023-12-21 | End: 2023-12-21

## 2023-12-21 RX ORDER — HEPARIN SODIUM 200 [USP'U]/100ML
INJECTION, SOLUTION INTRAVENOUS
Status: DISCONTINUED | OUTPATIENT
Start: 2023-12-21 | End: 2023-12-21 | Stop reason: HOSPADM

## 2023-12-21 RX ORDER — ATENOLOL 50 MG/1
50 TABLET ORAL DAILY
Status: DISCONTINUED | OUTPATIENT
Start: 2023-12-21 | End: 2023-12-22 | Stop reason: HOSPADM

## 2023-12-21 RX ORDER — ONDANSETRON 2 MG/ML
4 INJECTION INTRAMUSCULAR; INTRAVENOUS ONCE AS NEEDED
Status: DISCONTINUED | OUTPATIENT
Start: 2023-12-21 | End: 2023-12-21 | Stop reason: HOSPADM

## 2023-12-21 RX ORDER — HYDROCHLOROTHIAZIDE 25 MG/1
25 TABLET ORAL DAILY
Status: DISCONTINUED | OUTPATIENT
Start: 2023-12-21 | End: 2023-12-22 | Stop reason: HOSPADM

## 2023-12-21 RX ORDER — LIDOCAINE HYDROCHLORIDE 20 MG/ML
INJECTION, SOLUTION INFILTRATION; PERINEURAL
Status: DISCONTINUED | OUTPATIENT
Start: 2023-12-21 | End: 2023-12-21 | Stop reason: HOSPADM

## 2023-12-21 RX ORDER — FLUMAZENIL 0.1 MG/ML
0.2 INJECTION INTRAVENOUS AS NEEDED
Status: DISCONTINUED | OUTPATIENT
Start: 2023-12-21 | End: 2023-12-21 | Stop reason: HOSPADM

## 2023-12-21 RX ORDER — EPHEDRINE SULFATE 50 MG/ML
INJECTION, SOLUTION INTRAVENOUS AS NEEDED
Status: DISCONTINUED | OUTPATIENT
Start: 2023-12-21 | End: 2023-12-21 | Stop reason: SURG

## 2023-12-21 RX ORDER — SODIUM CHLORIDE 9 MG/ML
50 INJECTION, SOLUTION INTRAVENOUS CONTINUOUS
Status: DISCONTINUED | OUTPATIENT
Start: 2023-12-21 | End: 2023-12-22 | Stop reason: HOSPADM

## 2023-12-21 RX ORDER — FENTANYL CITRATE 50 UG/ML
INJECTION, SOLUTION INTRAMUSCULAR; INTRAVENOUS AS NEEDED
Status: DISCONTINUED | OUTPATIENT
Start: 2023-12-21 | End: 2023-12-21 | Stop reason: SURG

## 2023-12-21 RX ORDER — BUPIVACAINE HCL/0.9 % NACL/PF 0.125 %
PLASTIC BAG, INJECTION (ML) EPIDURAL AS NEEDED
Status: DISCONTINUED | OUTPATIENT
Start: 2023-12-21 | End: 2023-12-21 | Stop reason: SURG

## 2023-12-21 RX ORDER — SODIUM CHLORIDE 9 MG/ML
40 INJECTION, SOLUTION INTRAVENOUS AS NEEDED
Status: DISCONTINUED | OUTPATIENT
Start: 2023-12-21 | End: 2023-12-21 | Stop reason: HOSPADM

## 2023-12-21 RX ORDER — ASPIRIN 81 MG/1
81 TABLET, CHEWABLE ORAL DAILY
Status: DISCONTINUED | OUTPATIENT
Start: 2023-12-22 | End: 2023-12-22 | Stop reason: HOSPADM

## 2023-12-21 RX ORDER — PROPOFOL 10 MG/ML
VIAL (ML) INTRAVENOUS AS NEEDED
Status: DISCONTINUED | OUTPATIENT
Start: 2023-12-21 | End: 2023-12-21 | Stop reason: SURG

## 2023-12-21 RX ORDER — SODIUM CHLORIDE 0.9 % (FLUSH) 0.9 %
3-10 SYRINGE (ML) INJECTION AS NEEDED
Status: DISCONTINUED | OUTPATIENT
Start: 2023-12-21 | End: 2023-12-21

## 2023-12-21 RX ORDER — ATORVASTATIN CALCIUM 40 MG/1
40 TABLET, FILM COATED ORAL NIGHTLY
Status: DISCONTINUED | OUTPATIENT
Start: 2023-12-21 | End: 2023-12-22 | Stop reason: HOSPADM

## 2023-12-21 RX ORDER — SODIUM CHLORIDE 0.9 % (FLUSH) 0.9 %
10 SYRINGE (ML) INJECTION AS NEEDED
Status: DISCONTINUED | OUTPATIENT
Start: 2023-12-21 | End: 2023-12-21 | Stop reason: HOSPADM

## 2023-12-21 RX ORDER — LOSARTAN POTASSIUM 50 MG/1
50 TABLET ORAL 2 TIMES DAILY
Status: DISCONTINUED | OUTPATIENT
Start: 2023-12-21 | End: 2023-12-22 | Stop reason: HOSPADM

## 2023-12-21 RX ORDER — VENLAFAXINE HYDROCHLORIDE 37.5 MG/1
75 CAPSULE, EXTENDED RELEASE ORAL DAILY
Status: DISCONTINUED | OUTPATIENT
Start: 2023-12-22 | End: 2023-12-22 | Stop reason: HOSPADM

## 2023-12-21 RX ORDER — HEPARIN SODIUM 1000 [USP'U]/ML
INJECTION, SOLUTION INTRAVENOUS; SUBCUTANEOUS AS NEEDED
Status: DISCONTINUED | OUTPATIENT
Start: 2023-12-21 | End: 2023-12-21 | Stop reason: SURG

## 2023-12-21 RX ORDER — SODIUM CHLORIDE 0.9 % (FLUSH) 0.9 %
3 SYRINGE (ML) INJECTION EVERY 12 HOURS SCHEDULED
Status: DISCONTINUED | OUTPATIENT
Start: 2023-12-21 | End: 2023-12-21

## 2023-12-21 RX ORDER — HYDROCODONE BITARTRATE AND ACETAMINOPHEN 5; 325 MG/1; MG/1
1 TABLET ORAL ONCE AS NEEDED
Status: DISCONTINUED | OUTPATIENT
Start: 2023-12-21 | End: 2023-12-21 | Stop reason: HOSPADM

## 2023-12-21 RX ORDER — AMLODIPINE BESYLATE 5 MG/1
5 TABLET ORAL DAILY
Status: DISCONTINUED | OUTPATIENT
Start: 2023-12-22 | End: 2023-12-22 | Stop reason: HOSPADM

## 2023-12-21 RX ORDER — FENTANYL CITRATE 50 UG/ML
25 INJECTION, SOLUTION INTRAMUSCULAR; INTRAVENOUS
Status: DISCONTINUED | OUTPATIENT
Start: 2023-12-21 | End: 2023-12-21 | Stop reason: HOSPADM

## 2023-12-21 RX ORDER — SODIUM CHLORIDE 0.9 % (FLUSH) 0.9 %
10 SYRINGE (ML) INJECTION EVERY 12 HOURS SCHEDULED
Status: DISCONTINUED | OUTPATIENT
Start: 2023-12-21 | End: 2023-12-21 | Stop reason: HOSPADM

## 2023-12-21 RX ORDER — MAGNESIUM HYDROXIDE 1200 MG/15ML
LIQUID ORAL
Status: DISCONTINUED | OUTPATIENT
Start: 2023-12-21 | End: 2023-12-21 | Stop reason: HOSPADM

## 2023-12-21 RX ORDER — LABETALOL HYDROCHLORIDE 5 MG/ML
5 INJECTION, SOLUTION INTRAVENOUS
Status: DISCONTINUED | OUTPATIENT
Start: 2023-12-21 | End: 2023-12-21 | Stop reason: HOSPADM

## 2023-12-21 RX ADMIN — FENTANYL CITRATE 25 MCG: 50 INJECTION INTRAMUSCULAR; INTRAVENOUS at 13:31

## 2023-12-21 RX ADMIN — Medication 200 MCG: at 15:16

## 2023-12-21 RX ADMIN — Medication 100 MCG: at 14:22

## 2023-12-21 RX ADMIN — LOSARTAN POTASSIUM 50 MG: 50 TABLET, FILM COATED ORAL at 20:26

## 2023-12-21 RX ADMIN — EPHEDRINE SULFATE 10 MG: 50 INJECTION, SOLUTION INTRAVENOUS at 13:46

## 2023-12-21 RX ADMIN — Medication 100 MCG: at 14:14

## 2023-12-21 RX ADMIN — SODIUM CHLORIDE: 900 INJECTION, SOLUTION INTRAVENOUS at 14:27

## 2023-12-21 RX ADMIN — PROPOFOL 125 MCG/KG/MIN: 10 INJECTION, EMULSION INTRAVENOUS at 13:29

## 2023-12-21 RX ADMIN — Medication 100 MCG: at 14:58

## 2023-12-21 RX ADMIN — FENTANYL CITRATE 25 MCG: 50 INJECTION INTRAMUSCULAR; INTRAVENOUS at 13:40

## 2023-12-21 RX ADMIN — ACETAMINOPHEN 650 MG: 325 TABLET, FILM COATED ORAL at 19:47

## 2023-12-21 RX ADMIN — PROPOFOL 30 MG: 10 INJECTION, EMULSION INTRAVENOUS at 13:36

## 2023-12-21 RX ADMIN — PROTAMINE SULFATE 50 MG: 10 INJECTION, SOLUTION INTRAVENOUS at 15:28

## 2023-12-21 RX ADMIN — SODIUM CHLORIDE 40 ML: 9 INJECTION, SOLUTION INTRAVENOUS at 09:51

## 2023-12-21 RX ADMIN — Medication 100 MCG: at 14:04

## 2023-12-21 RX ADMIN — Medication 200 MCG: at 15:32

## 2023-12-21 RX ADMIN — ONDANSETRON 4 MG: 2 INJECTION INTRAMUSCULAR; INTRAVENOUS at 15:28

## 2023-12-21 RX ADMIN — ATORVASTATIN CALCIUM 40 MG: 40 TABLET, FILM COATED ORAL at 20:03

## 2023-12-21 RX ADMIN — DEXAMETHASONE SODIUM PHOSPHATE 4 MG: 4 INJECTION INTRA-ARTICULAR; INTRALESIONAL; INTRAMUSCULAR; INTRAVENOUS; SOFT TISSUE at 13:43

## 2023-12-21 RX ADMIN — FENTANYL CITRATE 25 MCG: 50 INJECTION INTRAMUSCULAR; INTRAVENOUS at 13:37

## 2023-12-21 RX ADMIN — Medication 200 MCG: at 14:33

## 2023-12-21 RX ADMIN — HEPARIN SODIUM 15000 UNITS: 1000 INJECTION, SOLUTION INTRAVENOUS; SUBCUTANEOUS at 14:45

## 2023-12-21 RX ADMIN — Medication 200 MCG: at 14:28

## 2023-12-21 RX ADMIN — FENTANYL CITRATE 25 MCG: 50 INJECTION INTRAMUSCULAR; INTRAVENOUS at 13:34

## 2023-12-21 RX ADMIN — PROPOFOL 70 MG: 10 INJECTION, EMULSION INTRAVENOUS at 14:50

## 2023-12-21 RX ADMIN — PROPOFOL 30 MG: 10 INJECTION, EMULSION INTRAVENOUS at 14:33

## 2023-12-21 RX ADMIN — HEPARIN SODIUM 10000 UNITS: 1000 INJECTION, SOLUTION INTRAVENOUS; SUBCUTANEOUS at 15:12

## 2023-12-21 RX ADMIN — Medication 200 MCG: at 15:05

## 2023-12-21 RX ADMIN — SODIUM CHLORIDE 50 ML/HR: 9 INJECTION, SOLUTION INTRAVENOUS at 17:42

## 2023-12-21 RX ADMIN — ATENOLOL 50 MG: 50 TABLET ORAL at 20:26

## 2023-12-21 RX ADMIN — HYDROCHLOROTHIAZIDE 25 MG: 25 TABLET ORAL at 20:27

## 2023-12-21 RX ADMIN — SODIUM CHLORIDE: 900 INJECTION, SOLUTION INTRAVENOUS at 13:22

## 2023-12-21 RX ADMIN — Medication 200 MCG: at 15:22

## 2023-12-21 NOTE — ANESTHESIA POSTPROCEDURE EVALUATION
Patient: Saman Ovalles    Procedure Summary       Date: 12/21/23 Room / Location: [facility] CATH [facility] 4 /  [facility] CATH INVASIVE LOCATION    Anesthesia Start: 1329 Anesthesia Stop: 1600    Procedure: EP/CRM Study Diagnosis:       Paroxysmal SVT (supraventricular tachycardia)      (SVT (67951))    Providers: Meliza Humphrey MD Provider: Dawna Velasquez CRNA    Anesthesia Type: MAC ASA Status: 3            Anesthesia Type: MAC    Vitals  Vitals Value Taken Time   /57 12/21/23 1646   Temp     Pulse 80 12/21/23 1648   Resp 15 12/21/23 1632   SpO2 100 % 12/21/23 1648   Vitals shown include unfiled device data.        Post Anesthesia Care and Evaluation    Patient location during evaluation: bedside  Patient participation: complete - patient participated  Level of consciousness: awake and alert  Pain management: adequate    Airway patency: patent  Anesthetic complications: No anesthetic complications  PONV Status: none  Cardiovascular status: acceptable  Respiratory status: acceptable  Hydration status: acceptable  No anesthesia care post op

## 2023-12-21 NOTE — INTERVAL H&P NOTE
H&P reviewed. The patient was examined and there are no changes to the H&P.      Since the time of the last clinic visit, denies significant change in symptoms.  Denies missing any doses of his medications.  No new ER visits or hospitalizations.    Exam:  Unchanged from last clinic visit.    Labs:  Reviewed.    Assessment/plan:  Saman Ovalles is a 73 y.o. male who presents to the hospital for outpatient EP study and ablation for SVT.  There are no apparent contraindications to proceeding and he is medically appropriate for outpatient management with this procedure.      I have discussed risks, benefits, and alternatives of an electrophysiology study and ablation for supraventricular tachycardia with the patient.  Alternatives discussed include continued observation and medical management.  An electrophysiology study with ablation is an inherently high risk procedure with possible complications that include but are not limited to vascular access complications, internal bleeding, tamponade requiring pericardiocentesis or cardiac surgery, stroke, MI, embolism, myocardial injury, injury to the normal conduction system requiring a pacemaker, and ultimately death.  We also discussed that given the uncertain nature of the diagnosis, therapeutic efficacy can be variable.  Possibilities of recurrent arrhythmias and the possible need for additional procedures and/or medical therapy was discussed. Questions asked were appropriately answered.  No guarantees were made or implied.     Despite this, they would still like to proceed.    Plan:  - Proceed with EP study and ablation

## 2023-12-21 NOTE — Clinical Note
Hemostasis started on the right femoral vein. Vascade MVP was used in achieving hemostasis. Closure device deployed in the vessel. Hemostasis achieved successfully. Closure device additional comment: Vascade times 3 used

## 2023-12-21 NOTE — ANESTHESIA PREPROCEDURE EVALUATION
Anesthesia Evaluation     Patient summary reviewed   no history of anesthetic complications:   NPO Solid Status: > 8 hours             Airway   Mallampati: I  TM distance: >3 FB  No difficulty expected  Dental          Pulmonary - negative pulmonary ROS   (-) asthma, sleep apnea, not a smoker  Cardiovascular   Exercise tolerance: good (4-7 METS)    (+) hypertension, dysrhythmias Tachycardia, hyperlipidemia  (-) past MI, cardiac stents      Neuro/Psych- negative ROS  (-) seizures, TIA, CVA  GI/Hepatic/Renal/Endo    (+) diabetes mellitus (diet controlled)  (-) liver disease, no renal disease    Musculoskeletal     Abdominal    Substance History      OB/GYN          Other                    Anesthesia Plan    ASA 3     MAC       Anesthetic plan, risks, benefits, and alternatives have been provided, discussed and informed consent has been obtained with: patient.    CODE STATUS:

## 2023-12-22 VITALS
HEIGHT: 69 IN | SYSTOLIC BLOOD PRESSURE: 119 MMHG | TEMPERATURE: 98.3 F | DIASTOLIC BLOOD PRESSURE: 55 MMHG | BODY MASS INDEX: 25.59 KG/M2 | OXYGEN SATURATION: 94 % | RESPIRATION RATE: 16 BRPM | WEIGHT: 172.8 LBS | HEART RATE: 72 BPM

## 2023-12-22 LAB
QT INTERVAL: 416 MS
QT INTERVAL: 418 MS
QTC INTERVAL: 442 MS
QTC INTERVAL: 488 MS

## 2023-12-22 RX ORDER — HYDROCHLOROTHIAZIDE 25 MG/1
25 TABLET ORAL DAILY
COMMUNITY

## 2023-12-22 RX ORDER — ASPIRIN 325 MG
325 TABLET ORAL DAILY
COMMUNITY

## 2023-12-22 RX ORDER — TESTOSTERONE 10 MG/.5G
6 GEL, METERED TOPICAL DAILY
COMMUNITY
End: 2023-12-29

## 2023-12-22 RX ORDER — AMLODIPINE BESYLATE 5 MG/1
5 TABLET ORAL DAILY
COMMUNITY

## 2023-12-22 NOTE — PLAN OF CARE
Goal Outcome Evaluation:  Plan of Care Reviewed With: patient        Progress: no change  Outcome Evaluation: Pt aox4. Pleasant and cooperative. VSS. Currently supine with HOB flat. Dsg to R groin c/d/i jean-incision soft, non tender. Pulses equal and palpable BUE/BLE. Call light within reach. Safety maintained.

## 2023-12-22 NOTE — PLAN OF CARE
Problem: Adult Inpatient Plan of Care  Goal: Plan of Care Review  Outcome: Ongoing, Progressing  Flowsheets (Taken 12/22/2023 8893)  Progress: improving  Plan of Care Reviewed With: patient  Outcome Evaluation: Pt c/o anterior left side of chest pain with radiation to his jaw, PRN PO Tylenol given with good relief. Per dayshift RNs MD was aware of chest pain. R groin dressing CDI. Pulses palpable. Bruising noted around R groin dressing. Possible discharge home. S/ST HR: . Safety maintained.

## 2023-12-24 DIAGNOSIS — E29.1 HYPOGONADISM IN MALE: Primary | ICD-10-CM

## 2023-12-28 NOTE — TELEPHONE ENCOUNTER
Rx Refill Note  Requested Prescriptions     Pending Prescriptions Disp Refills    Testosterone 10 MG/ACT (2%) gel [Pharmacy Med Name: TESTOST(F) GEL PUM 2%] 240 g 0     Sig: APPLY THE CONTENTS OF 6    PUMP ACTUATIONS ON THE SKINAS DIRECTED BY PROVIDER    DAILY      Last office visit with prescribing clinician: 10/30/2023   Next office visit with prescribing clinician: 1/30/2024     Last RF: 9/28/2023    CC: 4/4/2023    Ana Paredes CMA  12/28/23, 09:37 CST

## 2023-12-29 ENCOUNTER — OFFICE VISIT (OUTPATIENT)
Dept: FAMILY MEDICINE CLINIC | Facility: CLINIC | Age: 73
End: 2023-12-29
Payer: COMMERCIAL

## 2023-12-29 VITALS
HEIGHT: 69 IN | WEIGHT: 175 LBS | BODY MASS INDEX: 25.92 KG/M2 | DIASTOLIC BLOOD PRESSURE: 71 MMHG | SYSTOLIC BLOOD PRESSURE: 123 MMHG | TEMPERATURE: 97.8 F | OXYGEN SATURATION: 100 % | HEART RATE: 65 BPM

## 2023-12-29 DIAGNOSIS — Z86.79 S/P ABLATION OPERATION FOR ARRHYTHMIA: ICD-10-CM

## 2023-12-29 DIAGNOSIS — Z98.890 S/P ABLATION OPERATION FOR ARRHYTHMIA: ICD-10-CM

## 2023-12-29 DIAGNOSIS — Z09 HOSPITAL DISCHARGE FOLLOW-UP: Primary | ICD-10-CM

## 2023-12-29 DIAGNOSIS — I47.10 SVT (SUPRAVENTRICULAR TACHYCARDIA): ICD-10-CM

## 2023-12-29 DIAGNOSIS — S30.1XXA HEMATOMA OF GROIN, INITIAL ENCOUNTER: ICD-10-CM

## 2023-12-29 RX ORDER — TESTOSTERONE 10 MG/.5G
GEL, METERED TOPICAL
Qty: 240 G | Refills: 0 | Status: SHIPPED | OUTPATIENT
Start: 2023-12-29

## 2023-12-29 NOTE — PROGRESS NOTES
"Chief Complaint  Hospital Follow Up Visit (One week f/u ) and Hypertension (Would like to be off the hydroCHLOROthiazide)    Subjective        Saman Ovalles presents to [facility]  History of Present Illness  Mr. Ovalles presents for a hospital follow up.  He was placed in observation after he underwent a Cardiac ablation for SVT.    Post-procedure, he had shoulder and chest discomfort.  He denies issue since going home.  No palpitations, or heart racing.  No CP or SOA.      He has a large groin hematoma.  This is improving since going home.  No erythema or induration or fluctuance.  No fever    Objective   Vital Signs:  /71   Pulse 65   Temp 97.8 °F (36.6 °C)   Ht 175.3 cm (69\")   Wt 79.4 kg (175 lb)   SpO2 100%   BMI 25.84 kg/m²   Estimated body mass index is 25.84 kg/m² as calculated from the following:    Height as of this encounter: 175.3 cm (69\").    Weight as of this encounter: 79.4 kg (175 lb).               Physical Exam  Vitals reviewed.   Constitutional:       Appearance: He is well-developed.   HENT:      Head: Normocephalic and atraumatic.      Right Ear: External ear normal.      Left Ear: External ear normal.      Nose: Nose normal.   Eyes:      General: No scleral icterus.        Right eye: No discharge.         Left eye: No discharge.      Conjunctiva/sclera: Conjunctivae normal.      Pupils: Pupils are equal, round, and reactive to light.   Neck:      Thyroid: No thyromegaly.      Trachea: No tracheal deviation.   Cardiovascular:      Rate and Rhythm: Normal rate and regular rhythm.      Heart sounds: Normal heart sounds. No murmur heard.     No friction rub. No gallop.   Pulmonary:      Effort: Pulmonary effort is normal. No respiratory distress.      Breath sounds: Normal breath sounds. No stridor. No wheezing or rales.   Chest:      Chest wall: No tenderness.   Abdominal:      General: Bowel sounds are normal. There is no distension.      Palpations: " Abdomen is soft. There is no mass.      Tenderness: There is no abdominal tenderness. There is no guarding or rebound.      Hernia: No hernia is present.   Genitourinary:     Comments: Groin hematoma/brusing over right thigh/pelvis.    Musculoskeletal:         General: No deformity. Normal range of motion.      Cervical back: Normal range of motion and neck supple.   Lymphadenopathy:      Cervical: No cervical adenopathy.   Skin:     General: Skin is warm and dry.      Coloration: Skin is not pale.      Findings: No erythema or rash.   Neurological:      Mental Status: He is alert and oriented to person, place, and time.      Cranial Nerves: No cranial nerve deficit.      Motor: No abnormal muscle tone.      Coordination: Coordination normal.      Deep Tendon Reflexes: Reflexes are normal and symmetric. Reflexes normal.   Psychiatric:         Behavior: Behavior normal.         Thought Content: Thought content normal.         Judgment: Judgment normal.            Result Review :  Reviewed Hospital/Procedural notes           Assessment and Plan   Diagnoses and all orders for this visit:    1. S/P ablation operation for arrhythmia (Primary)    2. SVT (supraventricular tachycardia)    3. Hematoma of groin, initial encounter    Reassurance provided  It sounds like he is doing excellent other than the groin hematoma, which is improving  Follow up with Cardiology as scheduled  Follow up for Physical         Follow Up   No follow-ups on file.  Patient was given instructions and counseling regarding his condition or for health maintenance advice. Please see specific information pulled into the AVS if appropriate.

## 2024-01-01 NOTE — DISCHARGE SUMMARY
DISCHARGE NOTE    Patient Name: Saman Ovalles  Age/Sex: 73 y.o. male  : 1950  MRN: [id number]    Date of Discharge:  2024   Date of Admit: 2023  Encounter Provider: Meliza Humphrey MD  Place of Service: [facility]  Patient Care Team:  Dawna Daniels MD as PCP - General (Urgent Care)    Subjective:     Discharge Diagnosis:    Paroxysmal SVT (supraventricular tachycardia)    Cardiac abnormality        History of present illness:  EP Problems:  1.  Paroxysmal SVT     Cardiology Problems:  1.  Hypertension  2.  Hyperlipidemia     Medical Problems:  1.  Type 2 diabetes     Saman Ovalles is a 73 y.o. male with problem list as above who presents to the clinic for evaluation of paroxysmal SVT.  He states that he has had paroxysmal SVT for multiple decades.  For a long time, this is well controlled on atenolol.  Recently, he was started on diabetes medication and found that it markedly worsened his episodes of PSVT.  He has stopped the medication since that time and feels like things have gotten somewhat better but is still having frequent episodes.  He states on when the episodes are prolonged he feels poorly with chest discomfort, fatigue, shortness of breath.  Given these findings, he was referred to EP for further evaluation.        Hospital Course:   He was admitted to the hospital and underwent EPS and ablation for recurrent SVT.  He was found to have an atrial tachycardia from the inferior mitral annulus.  Following ablation, he was admitted to the hospital overnight for monitoring.  He was found to do well without significant issues.  He maintained sinus rhythm overnight and was able to be discharged home in the following day.          Vital Signs     No intake or output data in the 24 hours ending 24 1656    Physical Exam:  Well appearing, NAD  CTAB  RRR  Femoral access site with  mild bruising but no significant hematoma    Discharge Diet:    Cardiac diet     Activity Restrictions at Discharge:    No lifting >10lbs     Medication changes:  Start aspirin 325mg daily for 30 days then stop.     Discharge Medications     Discharge Medications        Changes to Medications        Instructions Start Date   aspirin 325 MG tablet  What changed: Another medication with the same name was removed. Continue taking this medication, and follow the directions you see here.   325 mg, Oral, Daily             Continue These Medications        Instructions Start Date   amLODIPine 5 MG tablet  Commonly known as: NORVASC   5 mg, Oral, Daily      atorvastatin 40 MG tablet  Commonly known as: LIPITOR   40 mg, Oral, Nightly      fenofibrate 160 MG tablet   160 mg, Oral, Daily      fish oil 1000 MG capsule capsule   2,000 mg, Oral, 2 Times Daily With Meals      hydroCHLOROthiazide 25 MG tablet  Commonly known as: HYDRODIURIL   25 mg, Oral, Daily      losartan 50 MG tablet  Commonly known as: COZAAR   50 mg, Oral, 2 Times Daily      venlafaxine XR 75 MG 24 hr capsule  Commonly known as: EFFEXOR-XR   75 mg, Oral, Daily      vitamin C 500 MG tablet  Commonly known as: ASCORBIC ACID   500 mg, Oral, Daily      vitamin D3 125 MCG (5000 UT) capsule capsule   5,000 Units, Oral, Daily             Stop These Medications      atenolol 50 MG tablet  Commonly known as: TENORMIN              Discharge disposition: home    Follow-up Appointments   Follow-up Information       Dawna Daniels MD Follow up in 1 week(s).    Specialties: Family Medicine, Emergency Medicine, Urgent Care  Contact information:  [address]  [place] [place] [address]  [phone]                           Future Appointments   Date Time Provider Department Center   1/22/2024  2:30 PM Radha Santana PA [facility]   1/30/2024 10:00 AM Dawna Daniels MD [facility]         Meliza Humphrey MD  01/01/24  16:56 CST

## 2024-01-30 ENCOUNTER — OFFICE VISIT (OUTPATIENT)
Dept: FAMILY MEDICINE CLINIC | Facility: CLINIC | Age: 74
End: 2024-01-30
Payer: COMMERCIAL

## 2024-01-30 VITALS
DIASTOLIC BLOOD PRESSURE: 76 MMHG | TEMPERATURE: 98.4 F | SYSTOLIC BLOOD PRESSURE: 120 MMHG | HEART RATE: 65 BPM | HEIGHT: 69 IN | RESPIRATION RATE: 20 BRPM | BODY MASS INDEX: 25.92 KG/M2 | OXYGEN SATURATION: 100 % | WEIGHT: 175 LBS

## 2024-01-30 DIAGNOSIS — F41.9 ANXIETY AND DEPRESSION: ICD-10-CM

## 2024-01-30 DIAGNOSIS — E11.65 TYPE 2 DIABETES MELLITUS WITH HYPERGLYCEMIA, WITHOUT LONG-TERM CURRENT USE OF INSULIN: Primary | ICD-10-CM

## 2024-01-30 DIAGNOSIS — F32.A ANXIETY AND DEPRESSION: ICD-10-CM

## 2024-01-30 DIAGNOSIS — E29.1 HYPOGONADISM IN MALE: ICD-10-CM

## 2024-01-30 DIAGNOSIS — Z12.5 PROSTATE CANCER SCREENING: ICD-10-CM

## 2024-01-30 DIAGNOSIS — I10 ESSENTIAL HYPERTENSION: ICD-10-CM

## 2024-01-30 PROCEDURE — 99214 OFFICE O/P EST MOD 30 MIN: CPT | Performed by: FAMILY MEDICINE

## 2024-02-20 ENCOUNTER — OFFICE VISIT (OUTPATIENT)
Dept: CARDIOLOGY | Facility: CLINIC | Age: 74
End: 2024-02-20
Payer: COMMERCIAL

## 2024-02-20 VITALS
WEIGHT: 169 LBS | HEART RATE: 78 BPM | HEIGHT: 69 IN | DIASTOLIC BLOOD PRESSURE: 62 MMHG | BODY MASS INDEX: 25.03 KG/M2 | SYSTOLIC BLOOD PRESSURE: 112 MMHG | OXYGEN SATURATION: 100 %

## 2024-02-20 DIAGNOSIS — I47.19 ATRIAL TACHYCARDIA: ICD-10-CM

## 2024-02-20 DIAGNOSIS — I47.10 PAROXYSMAL SVT (SUPRAVENTRICULAR TACHYCARDIA): Primary | ICD-10-CM

## 2024-02-20 PROCEDURE — 99214 OFFICE O/P EST MOD 30 MIN: CPT | Performed by: PHYSICIAN ASSISTANT

## 2024-02-20 PROCEDURE — 93000 ELECTROCARDIOGRAM COMPLETE: CPT | Performed by: PHYSICIAN ASSISTANT

## 2024-02-20 NOTE — PROGRESS NOTES
"[facility] -  CLINIC FOLLOW UP     Patient Care Team:  Dawna Daniels MD as PCP - General (Urgent Care)    Chief Complaint: follow up to atrial tachy    Subjective   EP Problems:  1.  Paroxysmal SVT     Cardiology Problems:  1.  Hypertension  2.  Hyperlipidemia     Medical Problems:  1.  Type 2 diabetes    HPI: Today I had the pleasure of seeing Saman Ovalles in the cardiology clinic for follow up. He is a 73 year old male with a history of  paroxysmal SVT for multiple decades.  For a long time, this is well controlled on atenolol.  Recently, he was started on diabetes medication and found that it markedly worsened his episodes of PSVT. He underwent EPS and had ablation for SVT which was found to be an atrial tachycardia from the inferior mitral annulus. There was not any evidence of AVNRT.     He states he has not had any recurrent palpitations or tachycardia. He denies any chest pain or progressive shortness of breath. EKG today shows NSR.     Objective     Visit Vitals  /62   Pulse 78   Ht 175.3 cm (69.02\")   Wt 76.7 kg (169 lb)   SpO2 100%   BMI 24.95 kg/m²           Vitals reviewed.   Constitutional:       Appearance: Healthy appearance. Not in distress.   Eyes:      Extraocular Movements: Extraocular movements intact.      Conjunctiva/sclera: Conjunctivae normal.      Pupils: Pupils are equal, round, and reactive to light.   HENT:      Head: Normocephalic and atraumatic.      Nose: Nose normal.    Mouth/Throat:      Lips: Pink.      Mouth: Mucous membranes are moist.      Pharynx: Oropharynx is clear.   Neck:      Vascular: No carotid bruit or JVD. JVD normal.   Pulmonary:      Effort: Pulmonary effort is normal.      Breath sounds: Normal breath sounds.   Chest:      Chest wall: Not tender to palpatation.   Cardiovascular:      PMI at left midclavicular line. Normal rate. Regular rhythm. Normal S1. Normal S2.       Murmurs: There is no murmur.      No gallop.  No rub. "   Pulses:     Radial: 2+ bilaterally.     Posterior tibial: 2+ bilaterally.  Edema:     Peripheral edema absent.   Abdominal:      General: Bowel sounds are normal.      Palpations: Abdomen is soft.   Musculoskeletal: Normal range of motion.      Extremities: No clubbing present.     Cervical back: Normal range of motion. Skin:     General: Skin is warm and dry.   Neurological:      General: No focal deficit present.      Mental Status: Alert and oriented to person, place, and time.   Psychiatric:         Attention and Perception: Attention normal.         Mood and Affect: Affect normal.         Speech: Speech normal.         Behavior: Behavior normal.         Cognition and Memory: Cognition normal.             The following portions of the patient's history were reviewed and updated as appropriate: allergies, current medications, past medical history, past social history, past and problem list.     Review of Systems   Constitutional: Negative.    HENT: Negative.     Eyes: Negative.    Respiratory: Negative.     Cardiovascular: Negative.    Gastrointestinal: Negative.    Endocrine: Negative.    Genitourinary: Negative.    Musculoskeletal: Negative.    Skin: Negative.    Allergic/Immunologic: Negative.    Neurological: Negative.    Hematological: Negative.    Psychiatric/Behavioral: Negative.                ECG 12 Lead    Date/Time: 2/20/2024 2:52 PM  Performed by: Radha Santana PA    Authorized by: Radha Santana PA  Comparison: compared with previous ECG from 12/22/2023  Rhythm: sinus rhythm  Rate: normal  QRS axis: normal    Clinical impression: normal ECG            Medication Review: yes    Current Outpatient Medications:     amLODIPine (NORVASC) 5 MG tablet, Take 1 tablet by mouth Daily., Disp: , Rfl:     aspirin 325 MG tablet, Take 1 tablet by mouth Daily., Disp: , Rfl:     atorvastatin (LIPITOR) 40 MG tablet, Take 1 tablet by mouth Every Night., Disp: 90 tablet, Rfl: 3    Cholecalciferol (VITAMIN D3)  125 MCG (5000 UT) capsule capsule, Take 1 capsule by mouth Daily., Disp: , Rfl:     fenofibrate 160 MG tablet, Take 1 tablet by mouth Daily., Disp: 90 tablet, Rfl: 3    hydroCHLOROthiazide (HYDRODIURIL) 25 MG tablet, Take 1 tablet by mouth Daily., Disp: , Rfl:     losartan (COZAAR) 50 MG tablet, TAKE 1 TABLET TWICE A DAY, Disp: 180 tablet, Rfl: 3    Omega-3 Fatty Acids (FISH OIL) 1000 MG capsule capsule, Take 2 capsules by mouth 2 (Two) Times a Day With Meals., Disp: , Rfl:     Testosterone 10 MG/ACT (2%) gel, APPLY THE CONTENTS OF 6    PUMP ACTUATIONS ON THE SKINAS DIRECTED BY PROVIDER    DAILY, Disp: 240 g, Rfl: 0    venlafaxine XR (EFFEXOR-XR) 75 MG 24 hr capsule, Take 1 capsule by mouth Daily., Disp: 90 capsule, Rfl: 3    vitamin C (ASCORBIC ACID) 500 MG tablet, Take 1 tablet by mouth Daily., Disp: , Rfl:    No Known Allergies    I have reviewed       CBC:  Lab Results - Last 18 Months   Lab Units 12/21/23  0927   WBC 10*3/mm3 4.80   HEMOGLOBIN g/dL 15.2   HEMATOCRIT % 44.2   PLATELETS 10*3/mm3 184      BMP/CMP:  Lab Results - Last 18 Months   Lab Units 12/21/23  0927   SODIUM mmol/L 137   POTASSIUM mmol/L 3.7   CHLORIDE mmol/L 98   CO2 mmol/L 28.0   GLUCOSE mg/dL 223*   BUN mg/dL 29*   CREATININE mg/dL 1.28*   CALCIUM mg/dL 9.4       Results for orders placed during the hospital encounter of 08/18/23    Adult Transthoracic Echo Complete W/ Cont if Necessary Per Protocol    Interpretation Summary    Left ventricular systolic function is low normal. Left ventricular ejection fraction appears to be 51 - 55%.    Left ventricular diastolic function is consistent with (grade I) impaired relaxation.    The left atrial cavity is mildly dilated.    Estimated right ventricular systolic pressure from tricuspid regurgitation is normal (<35 mmHg).    Normal size and function of the right ventricle.    No significant (greater than mild) valvular pathology.    No previous studies available for comparison.     Assessment:    Diagnoses and all orders for this visit:    1. Paroxysmal SVT (supraventricular tachycardia) (Primary)  -     ECG 12 Lead; Future    2. Atrial tachycardia    Other orders  -     ECG 12 Lead    Atrial tachycardia: S/p ablation and doing well without recurrence. He denies any complaints related to his procedure. EKG shows NSR today.   -Should he have any recurrent complaints of palpitations, can reassess with a monitor for afib, atrial flutter or other SVTs.   -Follow up in 6 months       I spent 30 minutes caring for Saman on this date of service. This time includes time spent by me in the following activities:preparing for the visit, reviewing tests, obtaining and/or reviewing a separately obtained history, performing a medically appropriate examination and/or evaluation , counseling and educating the patient/family/caregiver, ordering medications, tests, or procedures, referring and communicating with other health care professionals , documenting information in the medical record, and independently interpreting results and communicating that information with the patient/family/caregiver        Electronically signed by JASS Cosme

## 2024-03-23 DIAGNOSIS — E29.1 HYPOGONADISM IN MALE: ICD-10-CM

## 2024-03-26 RX ORDER — TESTOSTERONE 10 MG/.5G
GEL, METERED TOPICAL
Qty: 240 G | Refills: 0 | Status: SHIPPED | OUTPATIENT
Start: 2024-03-26

## 2024-03-26 NOTE — TELEPHONE ENCOUNTER
Rx Refill Note  Requested Prescriptions     Pending Prescriptions Disp Refills    Testosterone 10 MG/ACT (2%) gel [Pharmacy Med Name: TESTOST(F) GEL PUM 2%] 240 g 0     Sig: APPLY THE CONTENTS OF 6    PUMP ACTUATIONS ON THE SKINAS DIRECTED BY PROVIDER    DAILY      Last office visit with prescribing clinician: 1/30/2024   Next office visit with prescribing clinician: 4/30/2024    Last RF: 12/29/2023     CC: 4/4/2023    UDS: 4/4/2023        Ana Paredes CMA  03/26/24, 09:47 CDT

## 2024-04-30 ENCOUNTER — OFFICE VISIT (OUTPATIENT)
Dept: FAMILY MEDICINE CLINIC | Facility: CLINIC | Age: 74
End: 2024-04-30
Payer: OTHER GOVERNMENT

## 2024-04-30 VITALS
BODY MASS INDEX: 26.16 KG/M2 | TEMPERATURE: 98.3 F | SYSTOLIC BLOOD PRESSURE: 129 MMHG | WEIGHT: 176.6 LBS | RESPIRATION RATE: 18 BRPM | OXYGEN SATURATION: 97 % | HEART RATE: 65 BPM | DIASTOLIC BLOOD PRESSURE: 73 MMHG | HEIGHT: 69 IN

## 2024-04-30 DIAGNOSIS — Z79.899 MEDICATION MANAGEMENT: ICD-10-CM

## 2024-04-30 DIAGNOSIS — I10 PRIMARY HYPERTENSION: ICD-10-CM

## 2024-04-30 DIAGNOSIS — E11.65 TYPE 2 DIABETES MELLITUS WITH HYPERGLYCEMIA, WITHOUT LONG-TERM CURRENT USE OF INSULIN: ICD-10-CM

## 2024-04-30 DIAGNOSIS — B35.1 NAIL FUNGUS: Primary | ICD-10-CM

## 2024-04-30 PROCEDURE — 99213 OFFICE O/P EST LOW 20 MIN: CPT | Performed by: FAMILY MEDICINE

## 2024-04-30 RX ORDER — CICLOPIROX 80 MG/ML
SOLUTION TOPICAL NIGHTLY
Qty: 6 ML | Refills: 0 | Status: SHIPPED | OUTPATIENT
Start: 2024-04-30

## 2024-04-30 NOTE — PROGRESS NOTES
"Subjective cc: DM   Saman Ovalles is a 73 y.o. male.     History of Present Illness     The patient presents today for a follow-up.    The patient reports no current health concerns; however, he has been diagnosed with toenail fungus in three of his nails on his right foot. Despite the use of over-the-counter medication, there has been no improvement in his condition.    The patient maintains a healthy diet, incorporating more vegetables and protein into his diet, and incorporating a significant amount of meat. He engages in physical exercise five days a week, including two days of cardio, from Monday to Friday. His last ophthalmological examination was approximately 1 year ago.    His PSA was normal. His A1c was 7.5. He would like to continue trying diet and lifestyle changes instead of starting medication until next recheck. He has received the pneumonia vaccine.    The following portions of the patient's history were reviewed and updated as appropriate: allergies, current medications, past family history, past medical history, past social history, past surgical history, and problem list.        Review of Systems    Objective   /73 (BP Location: Left arm, Patient Position: Sitting, Cuff Size: Adult)   Pulse 65   Temp 98.3 °F (36.8 °C) (Infrared)   Resp 18   Ht 175.3 cm (69\")   Wt 80.1 kg (176 lb 9.6 oz)   SpO2 97%   BMI 26.08 kg/m²   Physical Exam  Vitals and nursing note reviewed.   Constitutional:       General: He is not in acute distress.     Appearance: He is well-developed. He is not diaphoretic.   HENT:      Head: Normocephalic and atraumatic.      Right Ear: External ear normal.      Left Ear: External ear normal.      Nose: Nose normal.   Eyes:      General:         Right eye: No discharge.         Left eye: No discharge.      Conjunctiva/sclera: Conjunctivae normal.   Neck:      Thyroid: No thyromegaly.      Trachea: No tracheal deviation.   Cardiovascular:      Rate and Rhythm: Normal rate " and regular rhythm.      Heart sounds: Normal heart sounds.   Pulmonary:      Effort: Pulmonary effort is normal. No respiratory distress.      Breath sounds: Normal breath sounds. No stridor. No wheezing.   Chest:      Chest wall: No tenderness.   Abdominal:      General: There is no distension.      Palpations: Abdomen is soft.      Tenderness: There is no abdominal tenderness.   Musculoskeletal:         General: Normal range of motion.      Cervical back: Normal range of motion.   Lymphadenopathy:      Cervical: No cervical adenopathy.   Skin:     General: Skin is warm and dry.   Neurological:      Mental Status: He is alert and oriented to person, place, and time.      Motor: No abnormal muscle tone.      Coordination: Coordination normal.   Psychiatric:         Behavior: Behavior normal.         Thought Content: Thought content normal.         Judgment: Judgment normal.                 Assessment & Plan   Problems Addressed this Visit          Endocrine and Metabolic    Type 2 diabetes mellitus with hyperglycemia, without long-term current use of insulin    Relevant Orders    Comprehensive metabolic panel    Hemoglobin A1c     Other Visit Diagnoses       Nail fungus    -  Primary    Relevant Medications    ciclopirox (PENLAC) 8 % solution    Medication management        Relevant Orders    ToxASSURE Select 13 (MW) - Urine, Clean Catch    Primary hypertension              Diagnoses         Codes Comments    Nail fungus    -  Primary ICD-10-CM: B35.1  ICD-9-CM: 110.1     Type 2 diabetes mellitus with hyperglycemia, without long-term current use of insulin     ICD-10-CM: E11.65  ICD-9-CM: 250.00, 790.29     Medication management     ICD-10-CM: Z79.899  ICD-9-CM: V58.69     Primary hypertension     ICD-10-CM: I10  ICD-9-CM: 401.9           1. Diabetes mellitus.  Chronic and uncontrolled with hyperglycemia. The patient's most recent hemoglobin A1c was elevated at 7.5. We will plan to repeat the patient's hemoglobin  A1c in about 4 weeks. We will see the patient back in the office in about 6 weeks. We will make a decision at that time. If his glucose is controlled, we will continue with dietary management. If it is uncontrolled, we will offer him diabetes medications. The patient is agreeable to this.    2. Nail fungus.  This is new. We will give the patient topical medication to apply to his nails to see if it improves.    3. Hypertension.  Chronic and controlled. Continue on existing prescriptions.    Transcribed from ambient dictation for Dawna Daniels MD by Mariya Pastrana.  04/30/24   14:55 CDT    Patient or patient representative verbalized consent to the visit recording.  I have personally performed the services described in this document as transcribed by the above individual, and it is both accurate and complete.           This document has been electronically signed by Dawna Daniels MD on May 2, 2024 17:57 CDT

## 2024-05-01 ENCOUNTER — TELEPHONE (OUTPATIENT)
Dept: FAMILY MEDICINE CLINIC | Facility: CLINIC | Age: 74
End: 2024-05-01
Payer: OTHER GOVERNMENT

## 2024-05-01 NOTE — TELEPHONE ENCOUNTER
Called pt to see if med sent yesterday need to be sent to local pharmacy or if he did want med sent to mail order- Hub to transfer

## 2024-05-08 LAB — DRUGS UR: NORMAL

## 2024-05-13 NOTE — TELEPHONE ENCOUNTER
Rx Refill Note  Requested Prescriptions     Pending Prescriptions Disp Refills    amLODIPine (NORVASC) 5 MG tablet [Pharmacy Med Name: AMLODIPINE TAB 5MG] 90 tablet 1     Sig: TAKE 1 TABLET DAILY      Last office visit with prescribing clinician: 4/30/2024   Next office visit with prescribing clinician: 6/7/2024       Ana Paredes CMA  05/13/24, 08:17 CDT

## 2024-05-14 RX ORDER — AMLODIPINE BESYLATE 5 MG/1
5 TABLET ORAL DAILY
Qty: 90 TABLET | Refills: 1 | Status: SHIPPED | OUTPATIENT
Start: 2024-05-14

## 2024-06-07 ENCOUNTER — OFFICE VISIT (OUTPATIENT)
Dept: FAMILY MEDICINE CLINIC | Facility: CLINIC | Age: 74
End: 2024-06-07
Payer: OTHER GOVERNMENT

## 2024-06-07 VITALS
SYSTOLIC BLOOD PRESSURE: 138 MMHG | BODY MASS INDEX: 25.18 KG/M2 | HEART RATE: 66 BPM | HEIGHT: 69 IN | OXYGEN SATURATION: 95 % | WEIGHT: 170 LBS | DIASTOLIC BLOOD PRESSURE: 60 MMHG | TEMPERATURE: 98.1 F | RESPIRATION RATE: 18 BRPM

## 2024-06-07 DIAGNOSIS — E78.1 HYPERTRIGLYCERIDEMIA: ICD-10-CM

## 2024-06-07 DIAGNOSIS — I10 ESSENTIAL HYPERTENSION: ICD-10-CM

## 2024-06-07 DIAGNOSIS — E29.1 HYPOGONADISM IN MALE: ICD-10-CM

## 2024-06-07 DIAGNOSIS — E11.65 TYPE 2 DIABETES MELLITUS WITH HYPERGLYCEMIA, WITHOUT LONG-TERM CURRENT USE OF INSULIN: Primary | ICD-10-CM

## 2024-06-07 PROCEDURE — 99214 OFFICE O/P EST MOD 30 MIN: CPT | Performed by: FAMILY MEDICINE

## 2024-06-07 RX ORDER — METFORMIN HYDROCHLORIDE 500 MG/1
500 TABLET, EXTENDED RELEASE ORAL
Qty: 90 TABLET | Refills: 0 | Status: SHIPPED | OUTPATIENT
Start: 2024-06-07

## 2024-06-07 NOTE — PROGRESS NOTES
"Ottoniel Ovalles is a 73 y.o. male.     History of Present Illness  The patient presents for evaluation of multiple medical concerns.    The patient expresses dissatisfaction with his current A1c levels, despite adhering to a healthy diet. He underwent a diabetic eye examination four weeks ago, which yielded no significant changes. He has not previously taken any diabetic medications.    Supplemental Information  He has pain in his left wrist during workouts and sometimes when he moves his hand. He thinks it is arthritis. He can feel a crunching sound when he does certain movements. He has tried Aspercreme and topical medicines.   The patient has no known allergies.  HTN is controlled   Weight is stable   Renal function is stable   Results  Laboratory Studies  A1c is 7.9.    The following portions of the patient's history were reviewed and updated as appropriate: allergies, current medications, past family history, past medical history, past social history, past surgical history, and problem list.        A review of systems was performed, and pertinent findings are noted in the HPI.    Objective   /60 (BP Location: Left arm, Patient Position: Sitting, Cuff Size: Large Adult)   Pulse 66   Temp 98.1 °F (36.7 °C) (Oral)   Resp 18   Ht 175.3 cm (69\")   Wt 77.1 kg (170 lb)   SpO2 95%   BMI 25.10 kg/m²          Physical Exam  Vitals and nursing note reviewed.   Constitutional:       General: He is not in acute distress.     Appearance: He is well-developed. He is not diaphoretic.   HENT:      Head: Normocephalic and atraumatic.      Right Ear: External ear normal.      Left Ear: External ear normal.      Nose: Nose normal.   Eyes:      General:         Right eye: No discharge.         Left eye: No discharge.      Conjunctiva/sclera: Conjunctivae normal.   Neck:      Thyroid: No thyromegaly.      Trachea: No tracheal deviation.   Cardiovascular:      Rate and Rhythm: Normal rate and regular rhythm. "      Heart sounds: Normal heart sounds.   Pulmonary:      Effort: Pulmonary effort is normal. No respiratory distress.      Breath sounds: Normal breath sounds. No stridor. No wheezing.   Chest:      Chest wall: No tenderness.   Musculoskeletal:         General: Normal range of motion.      Cervical back: Normal range of motion.   Lymphadenopathy:      Cervical: No cervical adenopathy.   Skin:     General: Skin is warm and dry.   Neurological:      Mental Status: He is alert and oriented to person, place, and time.      Motor: No abnormal muscle tone.      Coordination: Coordination normal.   Psychiatric:         Behavior: Behavior normal.         Thought Content: Thought content normal.         Judgment: Judgment normal.         Assessment & Plan   Problems Addressed this Visit          Cardiac and Vasculature    Hypertriglyceridemia    Relevant Orders    Lipid panel    Essential hypertension       Endocrine and Metabolic    Hypogonadism in male    Relevant Orders    PSA DIAGNOSTIC ONLY    Testosterone    Type 2 diabetes mellitus with hyperglycemia, without long-term current use of insulin - Primary    Relevant Medications    metFORMIN ER (GLUCOPHAGE-XR) 500 MG 24 hr tablet    Other Relevant Orders    Comprehensive metabolic panel    Hemoglobin A1c     Diagnoses         Codes Comments    Type 2 diabetes mellitus with hyperglycemia, without long-term current use of insulin    -  Primary ICD-10-CM: E11.65  ICD-9-CM: 250.00, 790.29     Essential hypertension     ICD-10-CM: I10  ICD-9-CM: 401.9     Hypertriglyceridemia     ICD-10-CM: E78.1  ICD-9-CM: 272.1     Hypogonadism in male     ICD-10-CM: E29.1  ICD-9-CM: 257.2             Assessment & Plan  1. Hypertension  The patient's hypertension is chronic, effectively managed. Continuation of existing prescriptions is advised.    2. Diabetes mellitus  The patient's condition is chronic, uncontrolled with hyperglycemia. Metformin will be initiated.    3.  Hyperlipidemia  The patient's hyperlipidemia is chronic, uncontrolled. A low cholesterol diet is recommended. Continuation of existing prescriptions is advised. A recheck in 3 months is advised.    4. Hypogonadism  The patient's hypogonadism is chronic, controlled. Continuation of existing testosterone replacement is advised. Regular lab testing will continue.    Follow-up  A follow-up appointment is scheduled for 3 months from now, during which repeat lab testing will be conducted for further evaluation.               Transcribed from ambient dictation for Dawna Daniels MD by Dawna Daniels MD.  06/07/24   13:58 CDT    Patient or patient representative verbalized consent for the use of Ambient Listening during the visit with  Dawna Daniels MD for chart documentation. 6/7/2024  13:59 CDT          This document has been electronically signed by Dawna Daniels MD on June 7, 2024 13:59 CDT

## 2024-06-20 DIAGNOSIS — E29.1 HYPOGONADISM IN MALE: ICD-10-CM

## 2024-06-20 RX ORDER — TESTOSTERONE 10 MG/.5G
GEL, METERED TOPICAL
Qty: 240 G | Refills: 0 | Status: SHIPPED | OUTPATIENT
Start: 2024-06-20

## 2024-06-20 NOTE — TELEPHONE ENCOUNTER
Rx Refill Note  Requested Prescriptions     Pending Prescriptions Disp Refills    Testosterone 10 MG/ACT (2%) gel [Pharmacy Med Name: TESTOST(F) GEL PUM 2%] 240 g 0     Sig: APPLY THE CONTENTS OF 6    PUMP ACTUATIONS ON THE SKINAS DIRECTED BY PROVIDER    DAILY      Last office visit with prescribing clinician: 6/7/2024   Last telemedicine visit with prescribing clinician: Visit date not found   Next office visit with prescribing clinician: 9/19/2024                         Would you like a call back once the refill request has been completed: [] Yes [] No    If the office needs to give you a call back, can they leave a voicemail: [] Yes [] No    Flavia Downing LPN  06/20/24, 08:27 CDT

## 2024-09-05 DIAGNOSIS — E11.65 TYPE 2 DIABETES MELLITUS WITH HYPERGLYCEMIA, WITHOUT LONG-TERM CURRENT USE OF INSULIN: ICD-10-CM

## 2024-09-06 NOTE — TELEPHONE ENCOUNTER
Rx Refill Note  Requested Prescriptions     Pending Prescriptions Disp Refills    metFORMIN ER (GLUCOPHAGE-XR) 500 MG 24 hr tablet [Pharmacy Med Name: METFORMIN ER TAB 500MG GP] 90 tablet 0     Sig: TAKE 1 TABLET DAILY WITH   BREAKFAST      Last office visit with prescribing clinician: 6/7/2024   Last telemedicine visit with prescribing clinician: Visit date not found   Next office visit with prescribing clinician: 9/19/2024                         Would you like a call back once the refill request has been completed: [] Yes [] No    If the office needs to give you a call back, can they leave a voicemail: [] Yes [] No    Areli Fragoso MA  09/06/24, 09:56 CDT

## 2024-09-09 RX ORDER — METFORMIN HCL 500 MG
500 TABLET, EXTENDED RELEASE 24 HR ORAL
Qty: 90 TABLET | Refills: 0 | Status: SHIPPED | OUTPATIENT
Start: 2024-09-09

## 2024-09-14 DIAGNOSIS — E29.1 HYPOGONADISM IN MALE: ICD-10-CM

## 2024-09-19 ENCOUNTER — OFFICE VISIT (OUTPATIENT)
Dept: FAMILY MEDICINE CLINIC | Facility: CLINIC | Age: 74
End: 2024-09-19
Payer: OTHER GOVERNMENT

## 2024-09-19 VITALS
HEIGHT: 69 IN | BODY MASS INDEX: 25.33 KG/M2 | TEMPERATURE: 98.4 F | WEIGHT: 171 LBS | OXYGEN SATURATION: 95 % | SYSTOLIC BLOOD PRESSURE: 128 MMHG | DIASTOLIC BLOOD PRESSURE: 62 MMHG | HEART RATE: 68 BPM

## 2024-09-19 DIAGNOSIS — E29.1 HYPOGONADISM IN MALE: ICD-10-CM

## 2024-09-19 DIAGNOSIS — I10 ESSENTIAL HYPERTENSION: ICD-10-CM

## 2024-09-19 DIAGNOSIS — F32.A ANXIETY AND DEPRESSION: ICD-10-CM

## 2024-09-19 DIAGNOSIS — F41.9 ANXIETY AND DEPRESSION: ICD-10-CM

## 2024-09-19 DIAGNOSIS — E11.65 TYPE 2 DIABETES MELLITUS WITH HYPERGLYCEMIA, WITHOUT LONG-TERM CURRENT USE OF INSULIN: Primary | ICD-10-CM

## 2024-09-19 DIAGNOSIS — E78.1 HYPERTRIGLYCERIDEMIA: ICD-10-CM

## 2024-09-19 PROCEDURE — 99214 OFFICE O/P EST MOD 30 MIN: CPT | Performed by: FAMILY MEDICINE

## 2024-09-19 RX ORDER — LOSARTAN POTASSIUM 50 MG/1
50 TABLET ORAL 2 TIMES DAILY
Qty: 180 TABLET | Refills: 3 | Status: SHIPPED | OUTPATIENT
Start: 2024-09-19

## 2024-09-19 RX ORDER — AMLODIPINE BESYLATE 5 MG/1
5 TABLET ORAL DAILY
Qty: 90 TABLET | Refills: 1 | Status: SHIPPED | OUTPATIENT
Start: 2024-09-19

## 2024-09-19 RX ORDER — ATORVASTATIN CALCIUM 40 MG/1
40 TABLET, FILM COATED ORAL NIGHTLY
Qty: 90 TABLET | Refills: 3 | Status: SHIPPED | OUTPATIENT
Start: 2024-09-19

## 2024-09-19 RX ORDER — FENOFIBRATE 160 MG/1
160 TABLET ORAL DAILY
Qty: 90 TABLET | Refills: 3 | Status: SHIPPED | OUTPATIENT
Start: 2024-09-19

## 2024-09-19 RX ORDER — VENLAFAXINE HYDROCHLORIDE 75 MG/1
75 CAPSULE, EXTENDED RELEASE ORAL DAILY
Qty: 90 CAPSULE | Refills: 3 | Status: SHIPPED | OUTPATIENT
Start: 2024-09-19

## 2024-09-19 RX ORDER — TESTOSTERONE GEL, 1% 10 MG/G
6 GEL TRANSDERMAL DAILY
Qty: 300 G | Refills: 1 | Status: SHIPPED | OUTPATIENT
Start: 2024-09-19

## 2024-09-20 RX ORDER — TESTOSTERONE 10 MG/.5G
GEL, METERED TOPICAL
Qty: 240 G | Refills: 0 | OUTPATIENT
Start: 2024-09-20

## 2024-10-04 ENCOUNTER — OFFICE VISIT (OUTPATIENT)
Dept: CARDIOLOGY | Facility: CLINIC | Age: 74
End: 2024-10-04
Payer: OTHER GOVERNMENT

## 2024-10-04 VITALS
HEART RATE: 51 BPM | SYSTOLIC BLOOD PRESSURE: 132 MMHG | OXYGEN SATURATION: 99 % | HEIGHT: 69 IN | WEIGHT: 171 LBS | DIASTOLIC BLOOD PRESSURE: 76 MMHG | BODY MASS INDEX: 25.33 KG/M2

## 2024-10-04 DIAGNOSIS — I47.10 PAROXYSMAL SVT (SUPRAVENTRICULAR TACHYCARDIA): Primary | ICD-10-CM

## 2024-10-04 PROCEDURE — 93000 ELECTROCARDIOGRAM COMPLETE: CPT | Performed by: PHYSICIAN ASSISTANT

## 2024-10-04 PROCEDURE — 99213 OFFICE O/P EST LOW 20 MIN: CPT | Performed by: PHYSICIAN ASSISTANT

## 2024-10-04 RX ORDER — ATENOLOL 50 MG/1
TABLET ORAL
COMMUNITY
Start: 2024-07-12

## 2024-10-04 NOTE — PROGRESS NOTES
"[facility] -  CLINIC FOLLOW UP     Patient Care Team:  Dawna Daniels MD as PCP - General (Urgent Care)    Chief Complaint: follow up to ablation     Subjective   EP Problems:  1.  Paroxysmal SVT     Cardiology Problems:  1.  Hypertension  2.  Hyperlipidemia     Medical Problems:  1.  Type 2 diabetes      HPI: Today I had the pleasure of seeing Saman Ovalles in the cardiology clinic for follow up.  He is a 74 year old male with a history of  paroxysmal SVT for multiple decades. He was able to undergo SVT ablation and was found to have left atrial tachycardia  from the inferior mitral annulus. There was not any evidence of AVNRT. He has done really well and has not had any further recurrence. He denies any presyncope or dizziness. His EKG has bradycardia, but he remains on atenolol which he has taken for a long time.         Objective     Visit Vitals  /76   Pulse 51   Ht 175.3 cm (69.02\")   Wt 77.6 kg (171 lb)   SpO2 99%   BMI 25.24 kg/m²           Vitals reviewed.   Constitutional:       Appearance: Healthy appearance. Not in distress.   Eyes:      Extraocular Movements: Extraocular movements intact.      Conjunctiva/sclera: Conjunctivae normal.      Pupils: Pupils are equal, round, and reactive to light.   HENT:      Head: Normocephalic and atraumatic.      Nose: Nose normal.    Mouth/Throat:      Lips: Pink.      Mouth: Mucous membranes are moist.      Pharynx: Oropharynx is clear.   Neck:      Vascular: No carotid bruit or JVD. JVD normal.   Pulmonary:      Effort: Pulmonary effort is normal.      Breath sounds: Normal breath sounds.   Chest:      Chest wall: Not tender to palpatation.   Cardiovascular:      PMI at left midclavicular line. Normal rate. Regular rhythm. Normal S1. Normal S2.       Murmurs: There is no murmur.      No gallop.  No rub.   Pulses:     Radial: 2+ bilaterally.  Edema:     Peripheral edema absent.   Abdominal:      General: Bowel sounds are normal.     "  Palpations: Abdomen is soft.   Musculoskeletal: Normal range of motion.      Extremities: No clubbing present.     Cervical back: Normal range of motion. Skin:     General: Skin is warm and dry.   Neurological:      General: No focal deficit present.      Mental Status: Alert and oriented to person, place, and time.   Psychiatric:         Attention and Perception: Attention normal.         Mood and Affect: Affect normal.         Speech: Speech normal.         Behavior: Behavior normal.         Cognition and Memory: Cognition normal.             The following portions of the patient's history were reviewed and updated as appropriate: allergies, current medications, past medical history, past social history, past and problem list.     Review of Systems   Constitutional: Negative.    HENT: Negative.     Eyes: Negative.    Respiratory: Negative.     Cardiovascular: Negative.    Gastrointestinal: Negative.    Endocrine: Negative.    Genitourinary: Negative.    Musculoskeletal: Negative.    Skin: Negative.    Allergic/Immunologic: Negative.    Neurological: Negative.    Hematological: Negative.    Psychiatric/Behavioral: Negative.              ECG 12 Lead    Date/Time: 10/4/2024 8:38 AM  Performed by: Radha Santana PA    Authorized by: Radha Santana PA  Comparison: compared with previous ECG from 2/20/2024  Rhythm: sinus bradycardia  BPM: 57            Medication Review: yes    Current Outpatient Medications:     amLODIPine (NORVASC) 5 MG tablet, Take 1 tablet by mouth Daily., Disp: 90 tablet, Rfl: 1    aspirin 325 MG tablet, Take 1 tablet by mouth Daily., Disp: , Rfl:     atenolol (TENORMIN) 50 MG tablet, , Disp: , Rfl:     atorvastatin (LIPITOR) 40 MG tablet, Take 1 tablet by mouth Every Night., Disp: 90 tablet, Rfl: 3    Cholecalciferol (VITAMIN D3) 125 MCG (5000 UT) capsule capsule, Take 1 capsule by mouth Daily., Disp: , Rfl:     ciclopirox (PENLAC) 8 % solution, Apply  topically to the appropriate area as  directed Every Night., Disp: 6 mL, Rfl: 0    fenofibrate 160 MG tablet, Take 1 tablet by mouth Daily., Disp: 90 tablet, Rfl: 3    hydroCHLOROthiazide (HYDRODIURIL) 25 MG tablet, Take 1 tablet by mouth Daily., Disp: , Rfl:     losartan (COZAAR) 50 MG tablet, Take 1 tablet by mouth 2 (Two) Times a Day., Disp: 180 tablet, Rfl: 3    metFORMIN ER (GLUCOPHAGE-XR) 500 MG 24 hr tablet, TAKE 1 TABLET DAILY WITH   BREAKFAST, Disp: 90 tablet, Rfl: 0    Omega-3 Fatty Acids (FISH OIL) 1000 MG capsule capsule, Take 2 capsules by mouth 2 (Two) Times a Day With Meals., Disp: , Rfl:     testosterone 12.5 MG/ACT (1%) gel, Place 6 Pump on the skin as directed by provider Daily., Disp: 300 g, Rfl: 1    venlafaxine XR (EFFEXOR-XR) 75 MG 24 hr capsule, Take 1 capsule by mouth Daily., Disp: 90 capsule, Rfl: 3    vitamin C (ASCORBIC ACID) 500 MG tablet, Take 1 tablet by mouth Daily., Disp: , Rfl:    No Known Allergies    I have reviewed       CBC:  Lab Results - Last 18 Months   Lab Units 12/21/23  0927   WBC 10*3/mm3 4.80   HEMOGLOBIN g/dL 15.2   HEMATOCRIT % 44.2   PLATELETS 10*3/mm3 184      BMP/CMP:  Lab Results - Last 18 Months   Lab Units 12/21/23  0927   SODIUM mmol/L 137   POTASSIUM mmol/L 3.7   CHLORIDE mmol/L 98   CO2 mmol/L 28.0   GLUCOSE mg/dL 223*   BUN mg/dL 29*   CREATININE mg/dL 1.28*   CALCIUM mg/dL 9.4         Results for orders placed during the hospital encounter of 08/18/23    Adult Transthoracic Echo Complete W/ Cont if Necessary Per Protocol    Interpretation Summary    Left ventricular systolic function is low normal. Left ventricular ejection fraction appears to be 51 - 55%.    Left ventricular diastolic function is consistent with (grade I) impaired relaxation.    The left atrial cavity is mildly dilated.    Estimated right ventricular systolic pressure from tricuspid regurgitation is normal (<35 mmHg).    Normal size and function of the right ventricle.    No significant (greater than mild) valvular pathology.     No previous studies available for comparison.     Assessment:   Diagnoses and all orders for this visit:    1. Paroxysmal SVT (supraventricular tachycardia) (Primary)  -     ECG 12 Lead; Future  -     ECG 12 Lead    SVT: S/p ablation 12/2023 and doing well since then. He had a left atrial tachycardia ablated from the posterior mitral annulus. No other AVNRT was inducible.   -He would like to follow up as needed  -Discussed that he could still develop other arrhythmias such as Afib or atrial flutter. If he notes palpitations or high heart rates inappropriately, then he should follow up sooner. He voiced understanding, but does not wish for long term follow up for SVT.         I spent 20 minutes caring for Saman on this date of service. This time includes time spent by me in the following activities:preparing for the visit, reviewing tests, obtaining and/or reviewing a separately obtained history, performing a medically appropriate examination and/or evaluation , counseling and educating the patient/family/caregiver, ordering medications, tests, or procedures, referring and communicating with other health care professionals , documenting information in the medical record, and independently interpreting results and communicating that information with the patient/family/caregiver        Electronically signed by JASS Cosme

## 2024-10-11 DIAGNOSIS — I47.10 PAROXYSMAL SVT (SUPRAVENTRICULAR TACHYCARDIA): ICD-10-CM

## 2024-10-11 RX ORDER — HYDROCHLOROTHIAZIDE 25 MG/1
25 TABLET ORAL DAILY
Qty: 90 TABLET | Refills: 3 | Status: SHIPPED | OUTPATIENT
Start: 2024-10-11

## 2024-10-11 RX ORDER — ATENOLOL 50 MG/1
50 TABLET ORAL DAILY
Qty: 90 TABLET | Refills: 3 | Status: SHIPPED | OUTPATIENT
Start: 2024-10-11

## 2024-10-11 NOTE — TELEPHONE ENCOUNTER
Rx Refill Note  Requested Prescriptions     Pending Prescriptions Disp Refills    hydroCHLOROthiazide 25 MG tablet [Pharmacy Med Name: HYDROCHLOROT TAB 25MG] 90 tablet 3     Sig: TAKE 1 TABLET DAILY    atenolol (TENORMIN) 50 MG tablet [Pharmacy Med Name: ATENOLOL TAB 50MG] 90 tablet 3     Sig: TAKE 1 TABLET DAILY      Last office visit with prescribing clinician: 9/19/2024   Next office visit with prescribing clinician: 12/17/2024     Ana Paredes CMA  10/11/24, 09:57 CDT

## 2024-12-17 ENCOUNTER — OFFICE VISIT (OUTPATIENT)
Dept: FAMILY MEDICINE CLINIC | Facility: CLINIC | Age: 74
End: 2024-12-17
Payer: OTHER GOVERNMENT

## 2024-12-17 VITALS
BODY MASS INDEX: 25.48 KG/M2 | WEIGHT: 172 LBS | SYSTOLIC BLOOD PRESSURE: 129 MMHG | HEART RATE: 60 BPM | TEMPERATURE: 97.5 F | RESPIRATION RATE: 18 BRPM | DIASTOLIC BLOOD PRESSURE: 7 MMHG | HEIGHT: 69 IN | OXYGEN SATURATION: 97 %

## 2024-12-17 DIAGNOSIS — F32.A ANXIETY AND DEPRESSION: ICD-10-CM

## 2024-12-17 DIAGNOSIS — E11.65 TYPE 2 DIABETES MELLITUS WITH HYPERGLYCEMIA, WITHOUT LONG-TERM CURRENT USE OF INSULIN: ICD-10-CM

## 2024-12-17 DIAGNOSIS — F41.9 ANXIETY AND DEPRESSION: ICD-10-CM

## 2024-12-17 DIAGNOSIS — E66.3 OVERWEIGHT: ICD-10-CM

## 2024-12-17 DIAGNOSIS — I10 ESSENTIAL HYPERTENSION: Primary | ICD-10-CM

## 2024-12-17 PROCEDURE — 99214 OFFICE O/P EST MOD 30 MIN: CPT | Performed by: FAMILY MEDICINE

## 2024-12-17 RX ORDER — METFORMIN HYDROCHLORIDE 500 MG/1
1000 TABLET, EXTENDED RELEASE ORAL
Qty: 180 TABLET | Refills: 1 | Status: SHIPPED | OUTPATIENT
Start: 2024-12-17

## 2024-12-17 NOTE — PROGRESS NOTES
"Ottoniel Ovalles is a 74 y.o. male.     History of Present Illness  The patient presents for evaluation of diabetes mellitus, testosterone deficiency, and medication management.    He reports no new health concerns at this time. He maintains an active lifestyle through regular exercise and adheres to a balanced diet. He is currently on a regimen of metformin 500 mg once daily, which he tolerates well. He expresses a desire to explore alternative treatment options for his diabetes, contingent upon the results of his upcoming lab tests.    He has been receiving refills of his testosterone gel, with the most recent refill providing him with 4 bottles. He notes an improvement in his overall well-being, including mood and energy levels, since starting the larger dosage of the testosterone gel.    He is on Effexor.    MEDICATIONS  Metformin, Effexor, testosterone gel.    Results  Laboratory Studies  A1c was 7.2 in September 2024. Total cholesterol was well controlled. Triglycerides were minimally elevated. LDL cholesterol was at the cut off of 100. Kidney function was normal. Electrolytes were all normal. Liver enzymes were normal. Testosterone was slightly on the lower side. PSA for prostate number was normal.    The following portions of the patient's history were reviewed and updated as appropriate: allergies, current medications, past family history, past medical history, past social history, past surgical history, and problem list.        A review of systems was performed, and pertinent findings are noted in the HPI.    Objective   BP (!) 129/7 (BP Location: Left arm, Patient Position: Sitting, Cuff Size: Adult)   Pulse 60   Temp 97.5 °F (36.4 °C) (Infrared)   Resp 18   Ht 175.3 cm (69\")   Wt 78 kg (172 lb)   SpO2 97%   BMI 25.40 kg/m²    BMI is >= 25 and <30. (Overweight) The following options were offered after discussion;: exercise counseling/recommendations and nutrition " counseling/recommendations     Physical Exam  Vitals and nursing note reviewed.   Constitutional:       General: He is not in acute distress.     Appearance: He is well-developed. He is not diaphoretic.   HENT:      Head: Normocephalic and atraumatic.      Right Ear: External ear normal.      Left Ear: External ear normal.      Nose: Nose normal.   Eyes:      General:         Right eye: No discharge.         Left eye: No discharge.      Conjunctiva/sclera: Conjunctivae normal.   Neck:      Thyroid: No thyromegaly.      Trachea: No tracheal deviation.   Cardiovascular:      Rate and Rhythm: Normal rate and regular rhythm.      Pulses: Normal pulses.   Pulmonary:      Effort: Pulmonary effort is normal. No respiratory distress.      Breath sounds: Normal breath sounds. No stridor. No wheezing.   Chest:      Chest wall: No tenderness.   Abdominal:      General: There is no distension.      Palpations: Abdomen is soft.      Tenderness: There is no abdominal tenderness.   Musculoskeletal:         General: Normal range of motion.      Cervical back: Normal range of motion.   Lymphadenopathy:      Cervical: No cervical adenopathy.   Skin:     General: Skin is warm and dry.   Neurological:      Mental Status: He is alert and oriented to person, place, and time.      Motor: No abnormal muscle tone.      Coordination: Coordination normal.   Psychiatric:         Behavior: Behavior normal.         Thought Content: Thought content normal.         Judgment: Judgment normal.         Assessment & Plan   Problems Addressed this Visit          Cardiac and Vasculature    Essential hypertension - Primary       Endocrine and Metabolic    Overweight    Type 2 diabetes mellitus with hyperglycemia, without long-term current use of insulin    Relevant Medications    metFORMIN ER (GLUCOPHAGE-XR) 500 MG 24 hr tablet       Mental Health    Anxiety and depression     Diagnoses         Codes Comments    Essential hypertension    -  Primary  ICD-10-CM: I10  ICD-9-CM: 401.9     Type 2 diabetes mellitus with hyperglycemia, without long-term current use of insulin     ICD-10-CM: E11.65  ICD-9-CM: 250.00, 790.29     Anxiety and depression     ICD-10-CM: F41.9, F32.A  ICD-9-CM: 300.00, 311     Overweight     ICD-10-CM: E66.3  ICD-9-CM: 278.02             Assessment & Plan  1. Diabetes mellitus.  His A1c level was recorded as 7.2 during the last evaluation in September 2024. The goal is to consistently maintain an A1c level below 7. He is currently managing his blood sugar levels with metformin 500 mg once daily. Updated lab orders have been placed in the system for him to complete at [facility]. A prescription for metformin has been provided, with an increased quantity to allow for a potential dosage escalation to twice daily if necessary. If the upcoming lab results indicate an A1c level above 7, consideration will be given to augmenting the metformin dosage.    2. Testosterone deficiency.  He reports feeling better with the current dosage, noting improvements in mood and energy levels. A refill for his testosterone gel has been issued. The maximum allowable quantity per his insurance will be determined, and the prescription will be adjusted accordingly to ensure a sufficient supply for a 3-month period, with one refill to cover a total of 6 months.    3. Medication management.  His Effexor prescription was renewed in September 2024 and remains valid. All other prescriptions have refills and are up to date.    Follow-up  The patient will follow up in a couple of months.               Transcribed from ambient dictation for Dawna Daniels MD by Dawna Daniels MD.  12/17/24   14:39 CST    Patient or patient representative verbalized consent for the use of Ambient Listening during the visit with  Dawna Daniels MD for chart documentation. 12/28/2024  14:40 CST          This document has been electronically signed by Dawna Daniels MD on December  28, 2024 19:21 CST

## 2025-01-07 DIAGNOSIS — E29.1 HYPOGONADISM IN MALE: ICD-10-CM

## 2025-01-07 NOTE — TELEPHONE ENCOUNTER
Caller: Saman Ovalles    Relationship: Self    Best call back number: [phone]     Requested Prescriptions:   Requested Prescriptions     Pending Prescriptions Disp Refills    testosterone 12.5 MG/ACT (1%) gel 300 g 1     Sig: Place 6 Pump on the skin as directed by provider Daily.        Pharm[facility] where request should be sent[facility] - [address] AT [address] [facility] - [phone]  - [phone] FX     Last office visit with prescribing clinician: 12/17/2024   Last telemedicine visit with prescribing clinician: Visit date not found   Next office visit with prescribing clinician: 3/18/2025     Additional details provided by patient: PATIENT IS ALMOST OUT    Does the patient have less than a 3 day supply:  [x] Yes  [] No    Would you like a call back once the refill request has been completed: [] Yes [x] No    If the office needs to give you a call back, can they leave a voicemail: [x] Yes [] No    Arline Cuellar Rep   01/07/25 12:38 CST

## 2025-01-09 NOTE — TELEPHONE ENCOUNTER
UDS 04/04/2024  CC04/04/2024      Rx Refill Note  Requested Prescriptions     Pending Prescriptions Disp Refills    testosterone 12.5 MG/ACT (1%) gel 300 g 1     Sig: Place 6 Pump on the skin as directed by provider Daily.      Last office visit with prescribing clinician: 12/17/2024   Last telemedicine visit with prescribing clinician: Visit date not found   Next office visit with prescribing clinician: 3/18/2025                         Would you like a call back once the refill request has been completed: [] Yes [] No    If the office needs to give you a call back, can they leave a voicemail: [] Yes [] No    Flavia Downing LPN  01/09/25, 08:48 CST

## 2025-01-10 RX ORDER — TESTOSTERONE GEL, 1% 10 MG/G
6 GEL TRANSDERMAL DAILY
Qty: 300 G | Refills: 1 | Status: SHIPPED | OUTPATIENT
Start: 2025-01-10

## 2025-02-12 DIAGNOSIS — I10 ESSENTIAL HYPERTENSION: ICD-10-CM

## 2025-02-13 RX ORDER — AMLODIPINE BESYLATE 5 MG/1
5 TABLET ORAL DAILY
Qty: 90 TABLET | Refills: 1 | OUTPATIENT
Start: 2025-02-13

## 2025-02-13 NOTE — TELEPHONE ENCOUNTER
Rx Refill Note  Requested Prescriptions     Pending Prescriptions Disp Refills    amLODIPine (NORVASC) 5 MG tablet [Pharmacy Med Name: AMLODIPINE TAB 5MG] 90 tablet 1     Sig: TAKE 1 TABLET DAILY      Last office visit with prescribing clinician: 12/17/2024     Next office visit with prescribing clinician: 3/18/2025     LRX:9/19/2024-6 months- refill not due until 3/2025    **Protocols report that pt has not had:  Normal serum potassium in past 12 months- completed 1/3/2025 WNL  GFR> 30 ml/min in past 12 months-completed 1/3/2025-WNL      Nataly Mayer MA  02/13/25, 10:36 CST

## 2025-03-13 ENCOUNTER — TELEPHONE (OUTPATIENT)
Dept: FAMILY MEDICINE CLINIC | Facility: CLINIC | Age: 75
End: 2025-03-13
Payer: OTHER GOVERNMENT

## (undated) DEVICE — KT NDL GUIDE STRL 18GA

## (undated) DEVICE — SOLIDIFIER LIQUI LOC PLUS 2000CC

## (undated) DEVICE — IMMOB KN 3PNL DLX CANVS 19IN BLU

## (undated) DEVICE — SOL IRR NACL 0.9PCT BT 1000ML

## (undated) DEVICE — Device: Brand: PENTARAY NAV

## (undated) DEVICE — SI AVANTI+ 8F STD W/GW  NO OBT: Brand: AVANTI

## (undated) DEVICE — PAD, DEFIB, ADULT, RADIOTRANS, PHYSIO: Brand: MEDLINE

## (undated) DEVICE — SYS CLS VASC/VENI VASCADE MVP 6TO12F

## (undated) DEVICE — ADHS SKIN PREMIERPRO EXOFIN TOPICAL HI/VISC .5ML

## (undated) DEVICE — Device: Brand: THERMOCOOL SMARTTOUCH SF

## (undated) DEVICE — SLV CBL IVL 5X96IN

## (undated) DEVICE — TBG PRESS/MONITR FIX M/F LL A/ 48IN STRL

## (undated) DEVICE — Device: Brand: REFERENCE PATCH CARTO 3

## (undated) DEVICE — Device: Brand: DECANAV

## (undated) DEVICE — PK CATH CARD 30 CA/4

## (undated) DEVICE — SI AVANTI+ 7F STD W/GW  NO OBT: Brand: AVANTI

## (undated) DEVICE — SI AVANTI+ 6F STD W/GW  NO OBT: Brand: AVANTI

## (undated) DEVICE — INTRO STEER AGILIS NXT MED/CURL 8.5F

## (undated) DEVICE — STERILE (15.2 TAPERED TO 7.6 X 183CM) POLYETHYLENE ACCORDION-FOLDED COVER FOR USE WITH SIEMENS ACUNAV ULTRASOUND CATHETER FAMILY CONNECTOR: Brand: SWIFTLINK TRANSDUCER COVER

## (undated) DEVICE — Device: Brand: WEBSTER CS

## (undated) DEVICE — Device: Brand: WEBSTER

## (undated) DEVICE — PRESSURE MONITORING SET: Brand: TRUWAVE

## (undated) DEVICE — Device: Brand: SOUNDSTAR

## (undated) DEVICE — NDL TRNSEP BRK XS LNG 18G 98CM A/

## (undated) DEVICE — SI AVANTI+ 10F STD W/GW: Brand: AVANTI

## (undated) DEVICE — SUREFIT, DUAL DISPERSIVE ELECTRODE, CONTACT QUALITY MONITOR: Brand: SUREFIT

## (undated) DEVICE — Device: Brand: SMARTABLATE